# Patient Record
Sex: MALE | Race: BLACK OR AFRICAN AMERICAN | NOT HISPANIC OR LATINO | Employment: FULL TIME | URBAN - METROPOLITAN AREA
[De-identification: names, ages, dates, MRNs, and addresses within clinical notes are randomized per-mention and may not be internally consistent; named-entity substitution may affect disease eponyms.]

---

## 2021-08-04 ENCOUNTER — IMMUNIZATION (OUTPATIENT)
Dept: PRIMARY CARE CLINIC | Facility: CLINIC | Age: 56
End: 2021-08-04
Payer: OTHER GOVERNMENT

## 2021-08-04 DIAGNOSIS — Z23 NEED FOR VACCINATION: Primary | ICD-10-CM

## 2021-08-04 PROCEDURE — 0001A COVID-19, MRNA, LNP-S, PF, 30 MCG/0.3 ML DOSE VACCINE: ICD-10-PCS | Mod: CV19,S$GLB,, | Performed by: FAMILY MEDICINE

## 2021-08-04 PROCEDURE — 91300 COVID-19, MRNA, LNP-S, PF, 30 MCG/0.3 ML DOSE VACCINE: ICD-10-PCS | Mod: S$GLB,,, | Performed by: FAMILY MEDICINE

## 2021-08-04 PROCEDURE — 0001A COVID-19, MRNA, LNP-S, PF, 30 MCG/0.3 ML DOSE VACCINE: CPT | Mod: CV19,S$GLB,, | Performed by: FAMILY MEDICINE

## 2021-08-04 PROCEDURE — 91300 COVID-19, MRNA, LNP-S, PF, 30 MCG/0.3 ML DOSE VACCINE: CPT | Mod: S$GLB,,, | Performed by: FAMILY MEDICINE

## 2021-08-25 ENCOUNTER — IMMUNIZATION (OUTPATIENT)
Dept: PRIMARY CARE CLINIC | Facility: CLINIC | Age: 56
End: 2021-08-25
Payer: OTHER GOVERNMENT

## 2021-08-25 DIAGNOSIS — Z23 NEED FOR VACCINATION: Primary | ICD-10-CM

## 2021-08-25 PROCEDURE — 0002A COVID-19, MRNA, LNP-S, PF, 30 MCG/0.3 ML DOSE VACCINE: CPT | Mod: CV19,S$GLB,, | Performed by: FAMILY MEDICINE

## 2021-08-25 PROCEDURE — 0002A COVID-19, MRNA, LNP-S, PF, 30 MCG/0.3 ML DOSE VACCINE: ICD-10-PCS | Mod: CV19,S$GLB,, | Performed by: FAMILY MEDICINE

## 2021-08-25 PROCEDURE — 91300 COVID-19, MRNA, LNP-S, PF, 30 MCG/0.3 ML DOSE VACCINE: ICD-10-PCS | Mod: S$GLB,,, | Performed by: FAMILY MEDICINE

## 2021-08-25 PROCEDURE — 91300 COVID-19, MRNA, LNP-S, PF, 30 MCG/0.3 ML DOSE VACCINE: CPT | Mod: S$GLB,,, | Performed by: FAMILY MEDICINE

## 2023-06-20 VITALS — WEIGHT: 178 LBS | BODY MASS INDEX: 31.54 KG/M2 | HEIGHT: 63 IN

## 2023-06-20 DIAGNOSIS — Z86.010 PERSONAL HISTORY OF COLONIC POLYPS: Primary | ICD-10-CM

## 2023-06-20 RX ORDER — SOD SULF/POT CHLORIDE/MAG SULF 1.479 G
12 TABLET ORAL DAILY
Qty: 24 TABLET | Refills: 0 | Status: SHIPPED | OUTPATIENT
Start: 2023-06-20

## 2023-06-20 NOTE — TELEPHONE ENCOUNTER
Pt wife came into clinic and got chart updated and scheduled. Pt wife voiced no hx of cpap, heart stent or walking problem. Pt wife will have information for the  when she leave.

## 2023-06-26 ENCOUNTER — TELEPHONE (OUTPATIENT)
Dept: GASTROENTEROLOGY | Facility: CLINIC | Age: 58
End: 2023-06-26
Payer: COMMERCIAL

## 2023-06-26 NOTE — TELEPHONE ENCOUNTER
----- Message from Yasmeen Donald sent at 6/26/2023  8:23 AM CDT -----  Regarding: Prep Medication  Contact: patient  Per phone call with patient, she stated that her  would like to take the liquid medication and not the sutab.  Please submit the liquid medication to the pharmacy.  Please return call at [phone] (home) or [phone].      [facility] [address] [facility]    Thanks,  SJ

## 2023-06-26 NOTE — TELEPHONE ENCOUNTER
Patient called and said you wanted her to increase her Pantoprazole to 40 mg 2 times a day.  She will need a refill if this is correct.  [facility].

## 2023-06-26 NOTE — TELEPHONE ENCOUNTER
Prep was called in to wrong pharmacy and he wants the liquid prep so the prep was called in to [facility] on [address] and patient was informed.

## 2023-06-26 NOTE — TELEPHONE ENCOUNTER
----- Message from Yasmeen Donald sent at 6/26/2023  8:23 AM CDT -----  Regarding: Prep Medication  Contact: patient  Per phone call with patient, she stated that her  would like to take the liquid medication and not the sutab.  Please submit the liquid medication to the pharmacy.  Please return call at [phone] (home) or [phone].      [facility] [address] [facility]    Thanks,  SJ

## 2023-08-04 DIAGNOSIS — Z86.010 PERSONAL HISTORY OF COLONIC POLYPS: Primary | ICD-10-CM

## 2023-08-04 NOTE — PROGRESS NOTES
"[facility]  [address] [place] [address]  Phone: [phone]  Fax: [phone]    History & Physical         Provider: Dr. Amauri Goff    Patient Name: Jose Cruz GONZALEZ (age):1965  57 y.o.           Gender: male   Phone: [phone]     Referring Physician: Katherine Perdomo     Vital Signs:   Height - 5' 3"  Weight - 178 lbs  BMI -  31.5    Plan: Colonoscopy    Encounter Diagnosis   Name Primary?    Personal history of colonic polyps Yes           History:      Past Medical History:   Diagnosis Date    Hx of colonic polyps       Past Surgical History:   Procedure Laterality Date    COLONOSCOPY  2018      Medication List with Changes/Refills   Current Medications    SOD SULF-POT CHLORIDE-MAG SULF (SUTAB) 1.479-0.188- 0.225 GRAM TABLET    Take 12 tablets by mouth once daily. Take according to package instructions with indicated amount of water. No breakfast day before test. May substitute with Suprep, Clenpiq, Plenvu, Moviprep or GoLytely based on Rx plan and patient preference.      Review of patient's allergies indicates:  No Known Allergies   Family History   Problem Relation Age of Onset    COPD Mother 62    No Known Problems Father     Hodgkin's lymphoma Brother 32    No Known Problems Maternal Grandmother     No Known Problems Maternal Grandfather     No Known Problems Paternal Grandmother     No Known Problems Paternal Grandfather       Social History     Tobacco Use    Smoking status: Every Day     Current packs/day: 0.00     Types: Cigarettes    Smokeless tobacco: Never   Substance Use Topics    Alcohol use: Never    Drug use: Never        Physical Examination:     General Appearance:___________________________  HEENT: " _____________________________________  Abdomen:____________________________________  Heart:________________________________________  Lungs:_______________________________________  Extremities:___________________________________  Skin:_________________________________________  Endocrine:____________________________________  Genitourinary:_________________________________  Neurological:__________________________________      Patient has been evaluated immediately prior to sedation and is medically cleared for endoscopy with IVCS as an ASA class: ______      Physician Signature: _________________________       Date: ________  Time: ________

## 2023-08-08 ENCOUNTER — OUTSIDE PLACE OF SERVICE (OUTPATIENT)
Dept: GASTROENTEROLOGY | Facility: CLINIC | Age: 58
End: 2023-08-08
Payer: COMMERCIAL

## 2023-08-08 LAB — CRC RECOMMENDATION EXT: NORMAL

## 2023-08-08 PROCEDURE — G0105 COLORECTAL SCRN; HI RISK IND: HCPCS | Mod: ,,, | Performed by: INTERNAL MEDICINE

## 2023-08-08 PROCEDURE — G0105 COLORECTAL SCRN; HI RISK IND: ICD-10-PCS | Mod: ,,, | Performed by: INTERNAL MEDICINE

## 2023-12-15 ENCOUNTER — DOCUMENTATION ONLY (OUTPATIENT)
Dept: GASTROENTEROLOGY | Facility: CLINIC | Age: 58
End: 2023-12-15

## 2025-06-18 ENCOUNTER — OUTSIDE PLACE OF SERVICE (OUTPATIENT)
Dept: PULMONOLOGY | Facility: CLINIC | Age: 60
End: 2025-06-18
Payer: COMMERCIAL

## 2025-06-19 ENCOUNTER — OUTSIDE PLACE OF SERVICE (OUTPATIENT)
Dept: PULMONOLOGY | Facility: CLINIC | Age: 60
End: 2025-06-19

## 2025-06-19 ENCOUNTER — OUTSIDE PLACE OF SERVICE (OUTPATIENT)
Dept: PULMONOLOGY | Facility: CLINIC | Age: 60
End: 2025-06-19
Payer: COMMERCIAL

## 2025-06-20 ENCOUNTER — TELEPHONE (OUTPATIENT)
Dept: HEMATOLOGY/ONCOLOGY | Facility: CLINIC | Age: 60
End: 2025-06-20
Payer: COMMERCIAL

## 2025-06-20 NOTE — TELEPHONE ENCOUNTER
Spoke with the patients wife Ms. Garner appointment date time and location provided. All question answered. TTRN

## 2025-06-25 ENCOUNTER — OFFICE VISIT (OUTPATIENT)
Dept: HEMATOLOGY/ONCOLOGY | Facility: CLINIC | Age: 60
End: 2025-06-25
Payer: COMMERCIAL

## 2025-06-25 VITALS
WEIGHT: 206.19 LBS | OXYGEN SATURATION: 93 % | HEIGHT: 69 IN | SYSTOLIC BLOOD PRESSURE: 147 MMHG | RESPIRATION RATE: 16 BRPM | HEART RATE: 117 BPM | BODY MASS INDEX: 30.54 KG/M2 | DIASTOLIC BLOOD PRESSURE: 95 MMHG

## 2025-06-25 DIAGNOSIS — C34.81 SMALL CELL CARCINOMA OF OVERLAPPING SITES OF RIGHT LUNG: Primary | ICD-10-CM

## 2025-06-25 RX ORDER — METFORMIN HYDROCHLORIDE 500 MG/1
500 TABLET ORAL DAILY
COMMUNITY

## 2025-06-25 NOTE — PROGRESS NOTES
MEDICAL ONCOLOGY INITIAL CONSULTATION NOTE    Patient ID: Jose Cruz Bustillo is a 59 y.o. male.    Chief Complaint: Small cell lung carcinoma    HPI:  Patient is a 59-year-old male who presented with obstructive pneumonia and was noted to have malignant tumor in the lung and liver by CTA chest and CT abdomen pelvis. Patient was found to have sepsis from postobstructive pneumonia, thoracocentesis was done with 700 mL of bloody appearing fluid which fluid was sent to lab for studies and cytology. Patient was given IV Zosyn and IV fluid. Labs reviewed LA 2.6 with repeat 1.1, pleural fluid showed cholesterol 99, TG 19, albumin 2.5, amylase 40, RBC 11K Bloody, LDH 1 8, glucose 266, protein 4.8, AFB, culture and cytology in process, D-dimer 1640 elevated, Pro-Ethan 63.11 elevated, C-reactive protein 1.5 elevated, troponin 12 normal, proBNP 40 normal, UA/proteinuria, blood culture in process, PTT normal,  elevated,  low. CT A PE chest, CT abdomen pelvic showed perihilar mass 3.7 cm with numerous metastasis to the right pleural, right pericardial fat and lymph node with significant mass effect on the right middle and lower lobe bronchi with near complete collapse of these lobes, negative PE, large to moderate right pleural effusion before thoracocentesis, 6 mm left lower lobe pulmonary nodule, malignancy within the right hepatic dome which could be direct metastasis or extension of adjacent pleural metastasis. Patient underwent bronchoscopic biopsy on 6/19 by pulmonology and tissue biopsy sent. Patient received Zosyn as inpatient and plan to be discharged on Augmentin. Patient weaned off oxygen and noted to saturate above 95% on room air upon exertion. Upon pulmonology evaluation patient appears to have underlying COPD/asthma as well and patient planned to be discharged on Trelegy and to follow-up with pulmonology next week Cultures negative to date.  Please see pathology report below.  He presents to our clinic  today for further evaluation.    Pathology:  25    A. LN 7:    Positive for small-cell carcinoma    B. Right hilar mass biopsy:  Positive for small-cell carcinoma    C.  Bronchoalveolar lavage: Right lower lobe    Blood, neutrophils with acute inflammation and rare atypical cells          Imagin25:    CT scan:    Chest   1. There is a right perihilar mass which measures 3.7 cm. There are numerous metastases to the right pleura and a metastasis to the right pericardial fat and to a subcarinal lymph node. The right hilar mass exerts significant mass effect on right middle   and lower lobe bronchi with near complete collapse of these lobes. Additional foci of neoplasm within the collapsed right middle and lower lobes is not excluded. There is also significant mass effect on the right lower lobe pulmonary artery. Thoracic   malignancy is suboptimally evaluated on this pulmonary arterial phase CT. Venous phase chest CT could be considered.   2. Negative for pulmonary embolism.   3. Large to moderate right pleural effusion.   4. There is a 6 mm left lower lobe pulmonary nodule.     Abdomen and pelvis   1. There is evidence of malignancy within the right hepatic dome, which could represent a metastasis or direct extension by adjacent pleural metastases.                     Past Medical History:   Diagnosis Date    Hx of colonic polyps      Family History   Problem Relation Name Age of Onset    COPD Mother  62    No Known Problems Father      Hodgkin's lymphoma Brother  32    Cancer Brother      No Known Problems Maternal Grandmother      No Known Problems Maternal Grandfather      No Known Problems Paternal Grandmother      No Known Problems Paternal Grandfather       Social History[1]  Past Surgical History:   Procedure Laterality Date    COLONOSCOPY  2018         Review of systems:  Review of Systems   Constitutional:  Negative for activity change, appetite change, chills, diaphoresis, fatigue and  unexpected weight change.   HENT:  Negative for congestion, facial swelling, hearing loss, mouth sores, trouble swallowing and voice change.    Eyes:  Negative for photophobia, pain, discharge and itching.   Respiratory:  Negative for apnea, cough, choking, chest tightness and shortness of breath.    Cardiovascular:  Negative for chest pain, palpitations and leg swelling.   Gastrointestinal:  Negative for abdominal distention, abdominal pain, anal bleeding and blood in stool.   Endocrine: Negative for cold intolerance, heat intolerance, polydipsia and polyphagia.   Genitourinary:  Negative for difficulty urinating, dysuria, flank pain and hematuria.   Musculoskeletal:  Negative for arthralgias, back pain, joint swelling, myalgias, neck pain and neck stiffness.   Skin:  Negative for color change, pallor and wound.   Allergic/Immunologic: Negative for environmental allergies, food allergies and immunocompromised state.   Neurological:  Negative for dizziness, seizures, facial asymmetry, speech difficulty, light-headedness, numbness and headaches.   Hematological:  Negative for adenopathy. Does not bruise/bleed easily.   Psychiatric/Behavioral:  Negative for agitation, behavioral problems, confusion, decreased concentration and sleep disturbance.            Physical Exam  Vitals and nursing note reviewed.   Constitutional:       General: He is not in acute distress.     Appearance: Normal appearance. He is not ill-appearing.   HENT:      Head: Normocephalic and atraumatic.      Nose: No congestion or rhinorrhea.   Eyes:      General: No scleral icterus.     Extraocular Movements: Extraocular movements intact.      Pupils: Pupils are equal, round, and reactive to light.   Cardiovascular:      Rate and Rhythm: Normal rate and regular rhythm.      Pulses: Normal pulses.      Heart sounds: Normal heart sounds. No murmur heard.     No gallop.   Pulmonary:      Effort: Pulmonary effort is normal. No respiratory distress.       Breath sounds: Normal breath sounds. No stridor. No wheezing or rhonchi.   Abdominal:      General: Bowel sounds are normal. There is no distension.      Palpations: There is no mass.      Tenderness: There is no abdominal tenderness. There is no guarding.   Musculoskeletal:         General: No swelling, tenderness, deformity or signs of injury. Normal range of motion.      Cervical back: Normal range of motion and neck supple. No rigidity. No muscular tenderness.      Right lower leg: No edema.      Left lower leg: No edema.   Skin:     General: Skin is warm.      Coloration: Skin is not jaundiced or pale.      Findings: No bruising or lesion.   Neurological:      General: No focal deficit present.      Mental Status: He is alert and oriented to person, place, and time.      Cranial Nerves: No cranial nerve deficit.      Sensory: No sensory deficit.      Motor: No weakness.      Gait: Gait normal.   Psychiatric:         Mood and Affect: Mood normal.         Behavior: Behavior normal.         Thought Content: Thought content normal.       Vitals:    06/25/25 1528   BP: (!) 147/95   Pulse: (!) 117   Resp: 16   Body surface area is 2.13 meters squared.    Assessment/Plan:      ECOG 1    Extensive stage small-cell lung cancer arising from the right hilum with metastasis to the pleura, right pericardium and liver:  Stage IV    == will obtain MRI brain for completion of staging and to rule out brain Mets  == will start patient on carbo etoposide Tecentriq to be followed by Tecentriq indefinitely  == tempus testing on tumor specimen        Plan:  MRI brain  Prior authorization for carbo etoposide Tecentriq  Port placement: Dr. Frey  First chemo can be given with peripheral IV if needed      Return to clinic in 1 week for NP visit with labs for treatment education      Future Appointments   Date Time Provider Department Center   6/30/2025 10:40 AM Mary Ku NP [facility]             Advance Care Planning      Date: 06/25/2025    Power of   I initiated the process of voluntary advance care planning today and explained the importance of this process to the patient.  I introduced the concept of advance directives to the patient, as well. Then the patient received detailed information about the importance of designating a Health Care Power of  (HCPOA). He was also instructed to communicate with this person about their wishes for future healthcare, should he become sick and lose decision-making capacity. The patient has not previously appointed a HCPOA. After our discussion, the patient has decided to complete a HCPOA and has appointed his  health care agent. Please see scanned on chart. I encouraged him to communicate with this person about their wishes for future healthcare, should he become sick and lose decision-making capacity.      A total of 20 min was spent on advance care planning, goals of care discussion, emotional support, formulating and communicating prognosis and exploring burden/benefit of various approaches of treatment. This discussion occurred on a fully voluntary basis with the verbal consent of the patient and/or family.           Total time spent in counseling and discussion about further management options including relevant lab work, treatment,  prognosis, medications and intended side effects was more than 60 minutes. More than 50% of the time was spent on counseling and coordination of care.  This includes face to face time and non-face to face time preparing to see the patient (eg, review of tests), Obtaining and/or reviewing separately obtained history, Documenting clinical information in the electronic or other health record, Independently interpreting resultsand communicating results to the patient/family/caregiver, or Care coordination.            [1]   Social History  Socioeconomic History    Marital status:    Tobacco Use    Smoking status: Former     Types:  Cigarettes    Smokeless tobacco: Never   Substance and Sexual Activity    Alcohol use: Not Currently    Drug use: Never     Social Drivers of Health     Financial Resource Strain: Low Risk  (6/16/2025)    [facility]    Overall Financial Resource Strain (CARDIA)     Difficulty of Paying Living Expenses: Not hard at all   Food Insecurity: No Food Insecurity (6/16/2025)    Received from [facility]    Hunger Vital Sign     Worried About Running Out of Food in the Last Year: Never true     Ran Out of Food in the Last Year: Never true   Transportation Needs: No Transportation Needs (6/16/2025)    Received from [facility]    PRAPARE - Transportation     In the past 12 months, has lack of transportation kept you from medical appointments or from getting medications?: No   Housing Stability: Low Risk  (6/16/2025)    [facility]    Housing Stability Vital Sign     At any time in the past 12 months, were you homeless or living in a shelter (including now)?: No

## 2025-06-26 ENCOUNTER — TELEPHONE (OUTPATIENT)
Dept: HEMATOLOGY/ONCOLOGY | Facility: CLINIC | Age: 60
End: 2025-06-26

## 2025-06-26 ENCOUNTER — PATIENT MESSAGE (OUTPATIENT)
Dept: HEMATOLOGY/ONCOLOGY | Facility: CLINIC | Age: 60
End: 2025-06-26

## 2025-06-26 DIAGNOSIS — C34.81 SMALL CELL CARCINOMA OF OVERLAPPING SITES OF RIGHT LUNG: Primary | ICD-10-CM

## 2025-06-26 RX ORDER — OLANZAPINE 5 MG/1
TABLET, FILM COATED ORAL
Qty: 4 TABLET | Refills: 3 | Status: SHIPPED | OUTPATIENT
Start: 2025-06-26

## 2025-06-26 RX ORDER — HYDROCODONE BITARTRATE AND ACETAMINOPHEN 10; 325 MG/1; MG/1
1 TABLET ORAL EVERY 8 HOURS PRN
Qty: 90 TABLET | Refills: 0 | Status: SHIPPED | OUTPATIENT
Start: 2025-06-26

## 2025-06-26 RX ORDER — DEXAMETHASONE 4 MG/1
8 TABLET ORAL DAILY
Qty: 6 TABLET | Refills: 3 | Status: SHIPPED | OUTPATIENT
Start: 2025-06-26

## 2025-06-26 RX ORDER — PROCHLORPERAZINE MALEATE 5 MG
10 TABLET ORAL EVERY 6 HOURS PRN
Qty: 20 TABLET | Refills: 5 | Status: SHIPPED | OUTPATIENT
Start: 2025-06-26

## 2025-06-26 NOTE — TELEPHONE ENCOUNTER
Copied from CRM #[id number]. Topic: Medications - Medication Status Check   >> Jun 26, 2025  8:08 AM Abigail wrote:  Patient is calling in regards to pain medication have not been call [facility]..please call them back at [phone]

## 2025-06-27 ENCOUNTER — OUTSIDE PLACE OF SERVICE (OUTPATIENT)
Dept: INTERVENTIONAL RADIOLOGY/VASCULAR | Facility: CLINIC | Age: 60
End: 2025-06-27
Payer: COMMERCIAL

## 2025-06-27 DIAGNOSIS — C34.81 SMALL CELL CARCINOMA OF OVERLAPPING SITES OF RIGHT LUNG: Primary | ICD-10-CM

## 2025-06-27 NOTE — PROGRESS NOTES
MEDICAL ONCOLOGY INITIAL CONSULTATION NOTE    Patient ID: Jose Cruz Bustillo is a 59 y.o. male.    Chief Complaint: Small cell lung carcinoma    HPI:  Patient is a 59-year-old male who presented with obstructive pneumonia and was noted to have malignant tumor in the lung and liver by CTA chest and CT abdomen pelvis. Patient was found to have sepsis from postobstructive pneumonia, thoracocentesis was done with 700 mL of bloody appearing fluid which fluid was sent to lab for studies and cytology. Patient was given IV Zosyn and IV fluid. Labs reviewed LA 2.6 with repeat 1.1, pleural fluid showed cholesterol 99, TG 19, albumin 2.5, amylase 40, RBC 11K Bloody, LDH 1 8, glucose 266, protein 4.8, AFB, culture and cytology in process, D-dimer 1640 elevated, Pro-Ethan 63.11 elevated, C-reactive protein 1.5 elevated, troponin 12 normal, proBNP 40 normal, UA/proteinuria, blood culture in process, PTT normal,  elevated,  low. CT A PE chest, CT abdomen pelvic showed perihilar mass 3.7 cm with numerous metastasis to the right pleural, right pericardial fat and lymph node with significant mass effect on the right middle and lower lobe bronchi with near complete collapse of these lobes, negative PE, large to moderate right pleural effusion before thoracocentesis, 6 mm left lower lobe pulmonary nodule, malignancy within the right hepatic dome which could be direct metastasis or extension of adjacent pleural metastasis. Patient underwent bronchoscopic biopsy on 6/19 by pulmonology and tissue biopsy sent. Patient received Zosyn as inpatient and plan to be discharged on Augmentin. Patient weaned off oxygen and noted to saturate above 95% on room air upon exertion. Upon pulmonology evaluation patient appears to have underlying COPD/asthma as well and patient planned to be discharged on Trelegy and to follow-up with pulmonology next week Cultures negative to date.  Please see pathology report below.  He presents to our clinic  today for further evaluation.    Pathology:  25    A. LN 7:    Positive for small-cell carcinoma    B. Right hilar mass biopsy:  Positive for small-cell carcinoma    C.  Bronchoalveolar lavage: Right lower lobe    Blood, neutrophils with acute inflammation and rare atypical cells          Imagin25:    CT scan:    Chest   1. There is a right perihilar mass which measures 3.7 cm. There are numerous metastases to the right pleura and a metastasis to the right pericardial fat and to a subcarinal lymph node. The right hilar mass exerts significant mass effect on right middle   and lower lobe bronchi with near complete collapse of these lobes. Additional foci of neoplasm within the collapsed right middle and lower lobes is not excluded. There is also significant mass effect on the right lower lobe pulmonary artery. Thoracic   malignancy is suboptimally evaluated on this pulmonary arterial phase CT. Venous phase chest CT could be considered.   2. Negative for pulmonary embolism.   3. Large to moderate right pleural effusion.   4. There is a 6 mm left lower lobe pulmonary nodule.     Abdomen and pelvis   1. There is evidence of malignancy within the right hepatic dome, which could represent a metastasis or direct extension by adjacent pleural metastases.         Past Medical History:   Diagnosis Date    Hx of colonic polyps      Family History   Problem Relation Name Age of Onset    COPD Mother  62    No Known Problems Father      Hodgkin's lymphoma Brother  32    Cancer Brother      No Known Problems Maternal Grandmother      No Known Problems Maternal Grandfather      No Known Problems Paternal Grandmother      No Known Problems Paternal Grandfather       Social History[1]  Past Surgical History:   Procedure Laterality Date    COLONOSCOPY  2018         Review of systems:  Review of Systems   Constitutional:  Negative for activity change, appetite change, chills, diaphoresis, fatigue and unexpected weight  change.   HENT:  Negative for congestion, facial swelling, hearing loss, mouth sores, trouble swallowing and voice change.    Eyes:  Negative for photophobia, pain, discharge and itching.   Respiratory:  Negative for apnea, cough, choking, chest tightness and shortness of breath.    Cardiovascular:  Negative for chest pain, palpitations and leg swelling.   Gastrointestinal:  Negative for abdominal distention, abdominal pain, anal bleeding and blood in stool.   Endocrine: Negative for cold intolerance, heat intolerance, polydipsia and polyphagia.   Genitourinary:  Negative for difficulty urinating, dysuria, flank pain and hematuria.   Musculoskeletal:  Negative for arthralgias, back pain, joint swelling, myalgias, neck pain and neck stiffness.   Skin:  Negative for color change, pallor and wound.   Allergic/Immunologic: Negative for environmental allergies, food allergies and immunocompromised state.   Neurological:  Negative for dizziness, seizures, facial asymmetry, speech difficulty, light-headedness, numbness and headaches.   Hematological:  Negative for adenopathy. Does not bruise/bleed easily.   Psychiatric/Behavioral:  Negative for agitation, behavioral problems, confusion, decreased concentration and sleep disturbance.            Physical Exam  Vitals and nursing note reviewed.   Constitutional:       General: He is not in acute distress.     Appearance: Normal appearance. He is not ill-appearing.   HENT:      Head: Normocephalic and atraumatic.      Nose: No congestion or rhinorrhea.   Eyes:      General: No scleral icterus.     Extraocular Movements: Extraocular movements intact.      Pupils: Pupils are equal, round, and reactive to light.   Cardiovascular:      Rate and Rhythm: Normal rate and regular rhythm.      Pulses: Normal pulses.      Heart sounds: Normal heart sounds. No murmur heard.     No gallop.   Pulmonary:      Effort: Pulmonary effort is normal. No respiratory distress.      Breath sounds:  Normal breath sounds. No stridor. No wheezing or rhonchi.   Abdominal:      General: Bowel sounds are normal. There is no distension.      Palpations: There is no mass.      Tenderness: There is no abdominal tenderness. There is no guarding.   Musculoskeletal:         General: No swelling, tenderness, deformity or signs of injury. Normal range of motion.      Cervical back: Normal range of motion and neck supple. No rigidity. No muscular tenderness.      Right lower leg: No edema.      Left lower leg: No edema.   Skin:     General: Skin is warm.      Coloration: Skin is not jaundiced or pale.      Findings: No bruising or lesion.   Neurological:      General: No focal deficit present.      Mental Status: He is alert and oriented to person, place, and time.      Cranial Nerves: No cranial nerve deficit.      Sensory: No sensory deficit.      Motor: No weakness.      Gait: Gait normal.   Psychiatric:         Mood and Affect: Mood normal.         Behavior: Behavior normal.         Thought Content: Thought content normal.       Vitals:    06/30/25 1023   BP: 131/89   Pulse: 106   Resp: 16   Temp: 98.9 °F (37.2 °C)   Body surface area is 2.13 meters squared.    Assessment/Plan:      ECOG 1    Extensive stage small-cell lung cancer arising from the right hilum with metastasis to the pleura, right pericardium and liver:  Stage IV    == will obtain MRI brain for completion of staging and to rule out brain Mets  == will start patient on carbo etoposide Tecentriq to be followed by Tecentriq indefinitely  == tempus testing on tumor specimen  6/30/25:  Patient here today to discuss upcoming chemotherapy/immunotherapy. An extensive discussion was had which included a thorough discussion of potential side effect profile, risk versus benefits, and expected outcomes.  Risks, including but not limited to, possible hair loss, bone marrow damage, damage to body organs, allergic reactions, sterility, nausea/vomiting,  constipation/diarrhea, sores in the mouth, secondary cancers, and rarely death were all discuss.  Specific side effects pertaining to their chemotherapy/immunotherapy medications were discussed as well.  The patient agrees with the plan and all questions and their support systems questions have been answered to their satisfaction.  Premedications were prescribed and patient was educated on appropriate usage.  Patient was educated on when to call, and given the number to call, or to notify the provider including but not limited to:  Persistent nausea and vomiting, dehydration, fever greater than 100.4 lasting over 1 hour induration or isolated feeders greater than 101, rash while on active chemotherapy or immunotherapy, severe pain or new onset pain not controlled by current pain medication regimen.      2. DM  Newly dx with diabetes while in-patient placed on metformin, increased LFTs advised to hold metformin and referral place to new PCP for evaluation    3. Elevated LFTs  See above    4. Referral to PCP for diabetes management    5. CINV - Antiemetics sent to pharmacy       Plan:  MRI brain pending authorization  Prior authorization for carbo etoposide Tecentriq to start tomorrow  Port placement: Dr. Frey pending  PICC can be pulled on day 3   First chemo can be given with peripheral IV if needed      Return to clinic in 3 week for NP visit with labs       No future appointments.            Advance Care Planning     Date: 06/25/2025    Power of   I initiated the process of voluntary advance care planning today and explained the importance of this process to the patient.  I introduced the concept of advance directives to the patient, as well. Then the patient received detailed information about the importance of designating a Health Care Power of  (HCPOA). He was also instructed to communicate with this person about their wishes for future healthcare, should he become sick and lose decision-making  capacity. The patient has not previously appointed a HCPOA. After our discussion, the patient has decided to complete a HCPOA and has appointed his  health care agent. Please see scanned on chart. I encouraged him to communicate with this person about their wishes for future healthcare, should he become sick and lose decision-making capacity.      A total of 20 min was spent on advance care planning, goals of care discussion, emotional support, formulating and communicating prognosis and exploring burden/benefit of various approaches of treatment. This discussion occurred on a fully voluntary basis with the verbal consent of the patient and/or family.           Total time spent in counseling and discussion about further management options including relevant lab work, treatment,  prognosis, medications and intended side effects was more than 60 minutes. More than 50% of the time was spent on counseling and coordination of care.  This includes face to face time and non-face to face time preparing to see the patient (eg, review of tests), Obtaining and/or reviewing separately obtained history, Documenting clinical information in the electronic or other health record, Independently interpreting resultsand communicating results to the patient/family/caregiver, or Care coordination.                [1]   Social History  Socioeconomic History    Marital status:    Tobacco Use    Smoking status: Former     Types: Cigarettes    Smokeless tobacco: Never   Substance and Sexual Activity    Alcohol use: Not Currently    Drug use: Never     Social Drivers of Health     Financial Resource Strain: Low Risk  (6/29/2025)    Overall Financial Resource Strain (CARDIA)     Difficulty of Paying Living Expenses: Not very hard   Food Insecurity: No Food Insecurity (6/29/2025)    Hunger Vital Sign     Worried About Running Out of Food in the Last Year: Never true     Ran Out of Food in the Last Year: Never true   Transportation Needs:  No Transportation Needs (6/29/2025)    PRAPARE - Transportation     Lack of Transportation (Medical): No     Lack of Transportation (Non-Medical): No   Physical Activity: Sufficiently Active (6/29/2025)    Exercise Vital Sign     Days of Exercise per Week: 5 days     Minutes of Exercise per Session: 60 min   Stress: No Stress Concern Present (6/29/2025)    [employer] - Occupational Stress Questionnaire     Feeling of Stress : Not at all   Housing Stability: Low Risk  (6/29/2025)    Housing Stability Vital Sign     Unable to Pay for Housing in the Last Year: No     Homeless in the Last Year: No

## 2025-06-30 ENCOUNTER — CLINICAL SUPPORT (OUTPATIENT)
Dept: HEMATOLOGY/ONCOLOGY | Facility: CLINIC | Age: 60
End: 2025-06-30
Payer: COMMERCIAL

## 2025-06-30 VITALS
OXYGEN SATURATION: 94 % | RESPIRATION RATE: 16 BRPM | WEIGHT: 205.69 LBS | SYSTOLIC BLOOD PRESSURE: 131 MMHG | DIASTOLIC BLOOD PRESSURE: 89 MMHG | HEIGHT: 69 IN | HEART RATE: 106 BPM | TEMPERATURE: 99 F | BODY MASS INDEX: 30.47 KG/M2

## 2025-06-30 DIAGNOSIS — R79.89 ELEVATED LFTS: ICD-10-CM

## 2025-06-30 DIAGNOSIS — R11.2 CINV (CHEMOTHERAPY-INDUCED NAUSEA AND VOMITING): ICD-10-CM

## 2025-06-30 DIAGNOSIS — E11.9 TYPE 2 DIABETES MELLITUS WITHOUT COMPLICATION, WITHOUT LONG-TERM CURRENT USE OF INSULIN: ICD-10-CM

## 2025-06-30 DIAGNOSIS — C34.81 SMALL CELL CARCINOMA OF OVERLAPPING SITES OF RIGHT LUNG: Primary | ICD-10-CM

## 2025-06-30 DIAGNOSIS — T45.1X5A CINV (CHEMOTHERAPY-INDUCED NAUSEA AND VOMITING): ICD-10-CM

## 2025-06-30 PROCEDURE — 99215 OFFICE O/P EST HI 40 MIN: CPT | Mod: S$PBB,,, | Performed by: NURSE PRACTITIONER

## 2025-06-30 RX ORDER — DIPHENHYDRAMINE HYDROCHLORIDE 50 MG/ML
50 INJECTION, SOLUTION INTRAMUSCULAR; INTRAVENOUS ONCE AS NEEDED
Status: CANCELLED | OUTPATIENT
Start: 2025-07-03

## 2025-06-30 RX ORDER — SODIUM CHLORIDE 0.9 % (FLUSH) 0.9 %
10 SYRINGE (ML) INJECTION
Status: CANCELLED | OUTPATIENT
Start: 2025-07-01

## 2025-06-30 RX ORDER — ONDANSETRON 8 MG/1
8 TABLET, ORALLY DISINTEGRATING ORAL EVERY 6 HOURS PRN
Qty: 30 TABLET | Refills: 3 | Status: SHIPPED | OUTPATIENT
Start: 2025-06-30 | End: 2026-06-30

## 2025-06-30 RX ORDER — PROCHLORPERAZINE EDISYLATE 5 MG/ML
10 INJECTION INTRAMUSCULAR; INTRAVENOUS ONCE AS NEEDED
Status: CANCELLED | OUTPATIENT
Start: 2025-07-02

## 2025-06-30 RX ORDER — HEPARIN 100 UNIT/ML
500 SYRINGE INTRAVENOUS
Status: CANCELLED | OUTPATIENT
Start: 2025-07-01

## 2025-06-30 RX ORDER — PROCHLORPERAZINE EDISYLATE 5 MG/ML
10 INJECTION INTRAMUSCULAR; INTRAVENOUS ONCE AS NEEDED
Status: CANCELLED | OUTPATIENT
Start: 2025-07-03

## 2025-06-30 RX ORDER — HEPARIN 100 UNIT/ML
500 SYRINGE INTRAVENOUS
Status: CANCELLED | OUTPATIENT
Start: 2025-07-02

## 2025-06-30 RX ORDER — EPINEPHRINE 0.3 MG/.3ML
0.3 INJECTION SUBCUTANEOUS ONCE AS NEEDED
Status: CANCELLED | OUTPATIENT
Start: 2025-07-01

## 2025-06-30 RX ORDER — EPINEPHRINE 0.3 MG/.3ML
0.3 INJECTION SUBCUTANEOUS ONCE AS NEEDED
Status: CANCELLED | OUTPATIENT
Start: 2025-07-02

## 2025-06-30 RX ORDER — SODIUM CHLORIDE 0.9 % (FLUSH) 0.9 %
10 SYRINGE (ML) INJECTION
Status: CANCELLED | OUTPATIENT
Start: 2025-07-03

## 2025-06-30 RX ORDER — PROCHLORPERAZINE EDISYLATE 5 MG/ML
10 INJECTION INTRAMUSCULAR; INTRAVENOUS ONCE AS NEEDED
Status: CANCELLED | OUTPATIENT
Start: 2025-07-01

## 2025-06-30 RX ORDER — EPINEPHRINE 0.3 MG/.3ML
0.3 INJECTION SUBCUTANEOUS ONCE AS NEEDED
Status: CANCELLED | OUTPATIENT
Start: 2025-07-03

## 2025-06-30 RX ORDER — PROMETHAZINE HYDROCHLORIDE 25 MG/1
25 TABLET ORAL EVERY 8 HOURS
Qty: 30 TABLET | Refills: 3 | Status: SHIPPED | OUTPATIENT
Start: 2025-06-30

## 2025-06-30 RX ORDER — DIPHENHYDRAMINE HYDROCHLORIDE 50 MG/ML
50 INJECTION, SOLUTION INTRAMUSCULAR; INTRAVENOUS ONCE AS NEEDED
Status: CANCELLED | OUTPATIENT
Start: 2025-07-02

## 2025-06-30 RX ORDER — OLANZAPINE 5 MG/1
TABLET, FILM COATED ORAL
Qty: 4 TABLET | Refills: 3 | Status: SHIPPED | OUTPATIENT
Start: 2025-06-30

## 2025-06-30 RX ORDER — HEPARIN 100 UNIT/ML
500 SYRINGE INTRAVENOUS
Status: CANCELLED | OUTPATIENT
Start: 2025-07-03

## 2025-06-30 RX ORDER — DIPHENHYDRAMINE HYDROCHLORIDE 50 MG/ML
50 INJECTION, SOLUTION INTRAMUSCULAR; INTRAVENOUS ONCE AS NEEDED
Status: CANCELLED | OUTPATIENT
Start: 2025-07-01

## 2025-06-30 RX ORDER — PROCHLORPERAZINE MALEATE 5 MG
10 TABLET ORAL EVERY 6 HOURS PRN
Qty: 20 TABLET | Refills: 5 | Status: SHIPPED | OUTPATIENT
Start: 2025-06-30

## 2025-06-30 RX ORDER — SODIUM CHLORIDE 0.9 % (FLUSH) 0.9 %
10 SYRINGE (ML) INJECTION
Status: CANCELLED | OUTPATIENT
Start: 2025-07-02

## 2025-07-01 ENCOUNTER — TELEPHONE (OUTPATIENT)
Dept: HEMATOLOGY/ONCOLOGY | Facility: CLINIC | Age: 60
End: 2025-07-01
Payer: COMMERCIAL

## 2025-07-01 NOTE — TELEPHONE ENCOUNTER
Per request documents faxed to [facility]. Fax # [phone]. [facility]

## 2025-07-02 ENCOUNTER — TELEPHONE (OUTPATIENT)
Dept: HEMATOLOGY/ONCOLOGY | Facility: CLINIC | Age: 60
End: 2025-07-02
Payer: COMMERCIAL

## 2025-07-07 ENCOUNTER — TELEPHONE (OUTPATIENT)
Dept: HEMATOLOGY/ONCOLOGY | Facility: CLINIC | Age: 60
End: 2025-07-07
Payer: COMMERCIAL

## 2025-07-08 ENCOUNTER — OFFICE VISIT (OUTPATIENT)
Dept: FAMILY MEDICINE | Facility: CLINIC | Age: 60
End: 2025-07-08
Payer: COMMERCIAL

## 2025-07-08 VITALS
SYSTOLIC BLOOD PRESSURE: 120 MMHG | BODY MASS INDEX: 30.13 KG/M2 | DIASTOLIC BLOOD PRESSURE: 81 MMHG | HEART RATE: 81 BPM | OXYGEN SATURATION: 97 % | WEIGHT: 204 LBS

## 2025-07-08 DIAGNOSIS — C34.81 SMALL CELL CARCINOMA OF OVERLAPPING SITES OF RIGHT LUNG: ICD-10-CM

## 2025-07-08 DIAGNOSIS — E11.9 TYPE 2 DIABETES MELLITUS WITHOUT COMPLICATION, WITHOUT LONG-TERM CURRENT USE OF INSULIN: Primary | ICD-10-CM

## 2025-07-08 DIAGNOSIS — Z00.00 ENCOUNTER FOR MEDICAL EXAMINATION TO ESTABLISH CARE: ICD-10-CM

## 2025-07-08 LAB — HBA1C MFR BLD: 9.2 %

## 2025-07-08 RX ORDER — GLIMEPIRIDE 2 MG/1
2 TABLET ORAL
Qty: 90 TABLET | Refills: 3 | Status: SHIPPED | OUTPATIENT
Start: 2025-07-08 | End: 2026-07-08

## 2025-07-08 NOTE — PROGRESS NOTES
Subjective:      Patient ID: Jose Cruz Bustillo is a 59 y.o. male.    Chief Complaint: Establish Care (B/S running high)      Patient is a 59-year-old male who presented with obstructive pneumonia and was noted to have malignant tumor in the lung and liver by CTA chest and CT abdomen pelvis. Patient was found to have sepsis from postobstructive pneumonia, thoracocentesis was done with 700 mL of bloody appearing fluid which fluid was sent to lab for studies and cytology. Patient was given IV Zosyn and IV fluid. Labs reviewed LA 2.6 with repeat 1.1, pleural fluid showed cholesterol 99, TG 19, albumin 2.5, amylase 40, RBC 11K Bloody, LDH 1 8, glucose 266, protein 4.8, AFB, culture and cytology in process, D-dimer 1640 elevated, Pro-Ethan 63.11 elevated, C-reactive protein 1.5 elevated, troponin 12 normal, proBNP 40 normal, UA/proteinuria, blood culture in process, PTT normal,  elevated,  low. CT A PE chest, CT abdomen pelvic showed perihilar mass 3.7 cm with numerous metastasis to the right pleural, right pericardial fat and lymph node with significant mass effect on the right middle and lower lobe bronchi with near complete collapse of these lobes, negative PE, large to moderate right pleural effusion before thoracocentesis, 6 mm left lower lobe pulmonary nodule, malignancy within the right hepatic dome which could be direct metastasis or extension of adjacent pleural metastasis. Patient underwent bronchoscopic biopsy on 6/19 by pulmonology and tissue biopsy sent. Patient received Zosyn as inpatient and plan to be discharged on Augmentin. Patient weaned off oxygen and noted to saturate above 95% on room air upon exertion. Upon pulmonology evaluation patient appears to have underlying COPD/asthma as well and patient planned to be discharged on Trelegy and to follow-up with pulmonology next week Cultures negative to date.  Please see pathology report below.       Pathology:  6/20/25     A. LN 7:     Positive for  small-cell carcinoma     B. Right hilar mass biopsy:  Positive for small-cell carcinoma     C.  Bronchoalveolar lavage: Right lower lobe    Blood, neutrophils with acute inflammation and rare atypical cells              Imagin25:     CT scan:     Chest   1. There is a right perihilar mass which measures 3.7 cm. There are numerous metastases to the right pleura and a metastasis to the right pericardial fat and to a subcarinal lymph node. The right hilar mass exerts significant mass effect on right middle   and lower lobe bronchi with near complete collapse of these lobes. Additional foci of neoplasm within the collapsed right middle and lower lobes is not excluded. There is also significant mass effect on the right lower lobe pulmonary artery. Thoracic   malignancy is suboptimally evaluated on this pulmonary arterial phase CT. Venous phase chest CT could be considered.   2. Negative for pulmonary embolism.   3. Large to moderate right pleural effusion.   4. There is a 6 mm left lower lobe pulmonary nodule.     Abdomen and pelvis   1. There is evidence of malignancy within the right hepatic dome, which could represent a metastasis or direct extension by adjacent pleural metastases.          He presents to our clinic today to establish PCP. Main issue is DM2.  He was on metformin for a few days but his LFTs spiked so it was discontinued. Denies P, P, P. Denies DKA.     No acute issues reported.         Past Medical History:   Diagnosis Date    Hx of colonic polyps       Social History     Socioeconomic History    Marital status:    Tobacco Use    Smoking status: Former     Types: Cigarettes    Smokeless tobacco: Never   Substance and Sexual Activity    Alcohol use: Not Currently    Drug use: Never     Social Drivers of Health     Financial Resource Strain: Low Risk  (2025)    Overall Financial Resource Strain (CARDIA)     Difficulty of Paying Living Expenses: Not very hard   Food Insecurity: No  Food Insecurity (6/29/2025)    Hunger Vital Sign     Worried About Running Out of Food in the Last Year: Never true     Ran Out of Food in the Last Year: Never true   Transportation Needs: No Transportation Needs (6/29/2025)    PRAPARE - Transportation     Lack of Transportation (Medical): No     Lack of Transportation (Non-Medical): No   Physical Activity: Sufficiently Active (6/29/2025)    Exercise Vital Sign     Days of Exercise per Week: 5 days     Minutes of Exercise per Session: 60 min   Stress: No Stress Concern Present (6/29/2025)    [employer] - Occupational Stress Questionnaire     Feeling of Stress : Not at all   Housing Stability: Low Risk  (6/29/2025)    Housing Stability Vital Sign     Unable to Pay for Housing in the Last Year: No     Homeless in the Last Year: No      Family History   Problem Relation Name Age of Onset    COPD Mother  62    No Known Problems Father      Hodgkin's lymphoma Brother  32    Cancer Brother      No Known Problems Maternal Grandmother      No Known Problems Maternal Grandfather      No Known Problems Paternal Grandmother      No Known Problems Paternal Grandfather          ROS:   Review of Systems   Constitutional:  Negative for appetite change, chills and fever.   Eyes:  Negative for visual disturbance.   Respiratory:  Negative for cough, chest tightness, shortness of breath and wheezing.    Cardiovascular:  Negative for chest pain.   Gastrointestinal:  Negative for abdominal pain, diarrhea, nausea and vomiting.   Endocrine: Negative for polydipsia, polyphagia and polyuria.   Genitourinary:  Negative for difficulty urinating, dysuria, flank pain and hematuria.   Musculoskeletal:  Negative for back pain and neck pain.   Skin:  Negative for rash.   Neurological:  Negative for dizziness, tremors and headaches.   Hematological:  Negative for adenopathy. Does not bruise/bleed easily.   Psychiatric/Behavioral:  Negative for confusion, dysphoric  mood, hallucinations, sleep disturbance and suicidal ideas.      Objective:   Physical Exam  Vitals and nursing note reviewed.   Constitutional:       General: He is not in acute distress.     Appearance: Normal appearance. He is not ill-appearing.   HENT:      Head: Normocephalic and atraumatic.      Nose: No congestion or rhinorrhea.   Eyes:      General: No scleral icterus.     Extraocular Movements: Extraocular movements intact.      Pupils: Pupils are equal, round, and reactive to light.   Cardiovascular:      Rate and Rhythm: Normal rate and regular rhythm.      Pulses: Normal pulses.      Heart sounds: Normal heart sounds. No murmur heard.     No gallop.   Pulmonary:      Effort: Pulmonary effort is normal. No respiratory distress.      Breath sounds: Normal breath sounds. No stridor. No wheezing or rhonchi.   Abdominal:      General: Bowel sounds are normal. There is no distension.      Palpations: There is no mass.      Tenderness: There is no abdominal tenderness. There is no guarding.   Musculoskeletal:         General: No swelling, tenderness, deformity or signs of injury. Normal range of motion.      Cervical back: Normal range of motion and neck supple. No rigidity. No muscular tenderness.      Right lower leg: No edema.      Left lower leg: No edema.   Skin:     General: Skin is warm.      Coloration: Skin is not jaundiced or pale.      Findings: No bruising or lesion.   Neurological:      General: No focal deficit present.      Mental Status: He is alert and oriented to person, place, and time.      Cranial Nerves: No cranial nerve deficit.      Sensory: No sensory deficit.      Motor: No weakness.      Gait: Gait normal.   Psychiatric:         Mood and Affect: Mood normal.         Behavior: Behavior normal.         Thought Content: Thought content normal.       Assessment:     1. Type 2 diabetes mellitus without complication, without long-term current use of insulin    2. Small cell carcinoma of  overlapping sites of right lung    3. Encounter for medical examination to establish care      No images are attached to the encounter.   Plan:     Problem List Items Addressed This Visit          Oncology    Small cell carcinoma of overlapping sites of right lung    Current Assessment & Plan     Followed by Oncology.             Endocrine    Type 2 diabetes mellitus without complication, without long-term current use of insulin - Primary    Current Assessment & Plan       PLAN      Start Glimepiride 2 mg.  Stop metformin 2/t transaminates.   F/u in 1 mts for A1C.  Will get lipids          Relevant Medications    glimepiride (AMARYL) 2 MG tablet    Other Relevant Orders    Lipid Panel    PSA, Screening    Microalbumin/Creatinine Ratio, Urine    Hemoglobin A1C, POCT (Completed)     Other Visit Diagnoses         Encounter for medical examination to establish care              Procedures     Office Visit on 07/08/2025   Component Date Value Ref Range Status    Hemoglobin A1C, POC 07/08/2025 9.2  % Final        X-Ray Chest 1 View  Result Date: 6/28/2025  XR CHEST XRAY 1 VW    INDICATION: s/p R thoracentesis    COMPARISON: Same day chest radiograph    TECHNIQUE: Frontal view of the chest was obtained.    FINDINGS:  Persistent lobar collapse of the right lower lobe. No pneumothorax identified following thoracentesis. Cardiac silhouette stable. Left lung remains clear.    Signer Name: Jethro Gibson MD  Signed: 6/28/2025 12:31 PM CDT  ()      Thoracentesis  Result Date: 6/28/2025  Felipe Scales MD     6/28/2025 12:43 PM  Thoracentesis    Date/Time: 6/28/2025 12:04 PM    Performed by: Felipe Scales MD  Authorized by: Felipe Scaels MD    Consent:     Consent obtained:  Written    Consent given by:  Patient    Risks discussed:  Bleeding, infection, pain, pneumothorax, nerve damage   and incomplete drainage    Alternatives discussed:  Delayed treatment  Universal protocol:     Procedure  explained and questions answered to patient or proxy's   satisfaction: yes      Relevant documents present and verified: yes      Imaging studies available: yes      Site/side marked: yes      Immediately prior to procedure a time out was called: yes      Patient identity confirmed:  Verbally with patient, arm band and   hospital-assigned identification number  Anesthesia (see MAR for exact dosages):     Anesthesia method:  Local infiltration    Local anesthetic:  Lidocaine 1% WITH epi (3mL)  Procedure details:     Patient position:  Sitting    Location:  R midscapular line    Intercostal space:  7th    Puncture method:  Over-the-needle catheter    Ultrasound guidance: yes      Needle gauge:  18    Number of attempts:  1    Drainage characteristics:  Clear and serosanguinous  Post-procedure details:     Chest x-ray performed: yes      Chest x-ray findings:  Pleural effusion improved    Estimated Blood Loss:  2    Patient tolerance of procedure:  Tolerated well, no immediate   complications  Comments:       removed 700 cc when fluid returned slowed and then stopped, applied   Xeroform with pressure dressing overlying the site      X-Ray Chest 1 View  Result Date: 6/28/2025  XR CHEST XRAY 1 VW    INDICATION: chest pain    COMPARISON: 6/16/2025    TECHNIQUE: Frontal view of the chest was obtained.    FINDINGS:  Persistent lobar collapse of the right lower lobe secondary to known underlying obstructing right hilar mass as seen on prior CT of the chest which appears unchanged from prior. Left lung is clear. No pneumothorax. Cardiac silhouette within normal   limits. Left upper extremity PICC terminates in the distal SVC.    Signer Name: Jethro Gibson MD  Signed: 6/28/2025 10:20 AM CDT  ()      IR PICC line insertion > 5 yrs  Result Date: 6/27/2025  PROCEDURE: Peripherally Inserted Central Catheter (PICC) placement    Procedural Personnel  Attending physician(s): Ky oLpez M.D.  Fellow physician(s):  None  Resident physician(s): None  Advanced practice provider(s): None    Procedure Date (mm/dd/yyyy): 6/27/2025    Pre-procedure diagnosis: Dyspnea  Post-procedure diagnosis: Same  Indication: Right lung mass  Additional clinical history: None    Complications: No immediate complications.      X-Ray Chest 1 View  Result Date: 6/16/2025  XR CHEST XRAY 1 VW    HISTORY:  Status post thoracentesis    COMPARISON:6/16/2025      CT Abdomen Pelvis With IV Contrast  Result Date: 6/16/2025  EXAM: CTA CHEST PULMONARY EMBOLISM PROTOCOL W CONTRAST, CT ABDOMEN PELVIS W CONTRAST, 6/16/2025 8:12 AM PDT    HISTORY: Dyspnea, pneumonia worsening 2-3 weeks, elevated d dimer, post thora aspiration of 700ml. iv contrast 100cc isovue 370, dose 8.9msv    TECHNIQUE: Axial CT images of the chest were obtained in the arterial phase, with CT images of the abdomen pelvis obtained in the venous phase, following intravenous administration of 100 mL of Isovue-370. In accordance with CT protocols and the ALARA   principal, radiation dose reduction techniques were utilized for this examination. Three-dimensional MIPS were generated and saved to PACS. Total radiation dose was 8.9 and 5.6 mSv    COMPARISON: None available at the time of dictation.    FINDINGS:    Chest    Heart and vasculature: Within anticipated limits.    Esophagus: Within anticipated limits.    Lymph nodes: Within anticipated limits.    Lungs and airways: Moderate to large right pleural effusion. Enhancing pleural nodules are present, best appreciated on the right lung base on CT abdomen images there is a 6.5 cm enhancing lesion along the inferior right pleura seen on coronal abdomen   series 5 image 30. Mild emphysema. There is a 6 mm left lower lobe pulmonary nodule series 5 image 190. There is a prominent right posterior pleural metastasis series 5 image 123.    Bones: Within anticipated limits.    Other: There is a 3.17 m subcarinal lymph node. There is right hilar adenopathy.  There is a 2.9 cm right pericardial necrotic lesion which abuts the suprahepatic IVC. There is a right perihilar mass which measures approximately 3.7 cm. This exerts   significant mass effect on the right middle and lower lobe bronchi, with near complete right middle lobe collapse and significant volume loss of the right lower lobe. The mass also exerts mass effect on right lower lobe pulmonary arteries    Abdomen and pelvis  Hepatobiliary pancreatic: There is malignancy within the right hepatic dome measuring up to 2.6 cm. Question metastasis to the hepatic dome versus direct extension by pleural metastases, see sagittal series 6 image 58    Spleen: Within anticipated limits.    Urogenital: Moderate left hydronephrosis without obstructing lesion appreciated. Mild prostatomegaly.    Gastrointestinal: Within anticipated limits. Normal appendix.    Vasculature: Within anticipated limits.    Bones: Within anticipated limits.    Other: No other acute findings.      CTA Chest Non-Coronary (PE Studies)  Result Date: 6/16/2025  EXAM: CTA CHEST PULMONARY EMBOLISM PROTOCOL W CONTRAST, CT ABDOMEN PELVIS W CONTRAST, 6/16/2025 8:12 AM PDT    HISTORY: Dyspnea, pneumonia worsening 2-3 weeks, elevated d dimer, post thora aspiration of 700ml. iv contrast 100cc isovue 370, dose 8.9msv    TECHNIQUE: Axial CT images of the chest were obtained in the arterial phase, with CT images of the abdomen pelvis obtained in the venous phase, following intravenous administration of 100 mL of Isovue-370. In accordance with CT protocols and the ALARA   principal, radiation dose reduction techniques were utilized for this examination. Three-dimensional MIPS were generated and saved to PACS. Total radiation dose was 8.9 and 5.6 mSv    COMPARISON: None available at the time of dictation.    FINDINGS:    Chest    Heart and vasculature: Within anticipated limits.    Esophagus: Within anticipated limits.    Lymph nodes: Within anticipated  limits.    Lungs and airways: Moderate to large right pleural effusion. Enhancing pleural nodules are present, best appreciated on the right lung base on CT abdomen images there is a 6.5 cm enhancing lesion along the inferior right pleura seen on coronal abdomen   series 5 image 30. Mild emphysema. There is a 6 mm left lower lobe pulmonary nodule series 5 image 190. There is a prominent right posterior pleural metastasis series 5 image 123.    Bones: Within anticipated limits.    Other: There is a 3.17 m subcarinal lymph node. There is right hilar adenopathy. There is a 2.9 cm right pericardial necrotic lesion which abuts the suprahepatic IVC. There is a right perihilar mass which measures approximately 3.7 cm. This exerts   significant mass effect on the right middle and lower lobe bronchi, with near complete right middle lobe collapse and significant volume loss of the right lower lobe. The mass also exerts mass effect on right lower lobe pulmonary arteries    Abdomen and pelvis  Hepatobiliary pancreatic: There is malignancy within the right hepatic dome measuring up to 2.6 cm. Question metastasis to the hepatic dome versus direct extension by pleural metastases, see sagittal series 6 image 58    Spleen: Within anticipated limits.    Urogenital: Moderate left hydronephrosis without obstructing lesion appreciated. Mild prostatomegaly.    Gastrointestinal: Within anticipated limits. Normal appendix.    Vasculature: Within anticipated limits.    Bones: Within anticipated limits.    Other: No other acute findings.      Thoracentesis  Result Date: 6/16/2025  Ky Wagner MD     6/16/2025  2:44 PM  Thoracentesis    Date/Time: 6/16/2025 10:34 AM    Performed by: Ky Wagner MD  Authorized by: Ky Wagner MD    Consent:     Consent obtained:  Written and emergent situation    Consent given by:  Patient    Risks discussed:  Bleeding, infection, pain, incomplete drainage and   pneumothorax    Alternatives  discussed:  No treatment and delayed treatment  Universal protocol:     Procedure explained and questions answered to patient or proxy's   satisfaction: yes      Test results available and properly labeled: yes      Patient identity confirmed:  Verbally with patient  Anesthesia (see MAR for exact dosages):     Anesthesia method:  Local infiltration    Local anesthetic:  Lidocaine 1% w/o epi  Procedure details:     Preparation: Patient was prepped and draped in usual sterile fashion      Patient position:  Sitting    Location:  R midscapular line    Intercostal space:  5th    Puncture method:  Over-the-needle catheter    Ultrasound guidance: yes      Indwelling catheter placed: no      Needle gauge:  16    Catheter size:  8 Fr    Number of attempts:  1    Drainage characteristics:  Cloudy, bloody and serosanguinous  Post-procedure details:     Chest x-ray performed: yes      Chest x-ray findings:  Pleural effusion unchanged    Estimated Blood Loss:  0    Patient tolerance of procedure:  Tolerated well, no immediate   complications  Comments:      withdrew 700 mL of fluid from the right lung      X-Ray Chest 1 View  Result Date: 6/16/2025  XR CHEST XRAY 1 VW    HISTORY:  Cough , SOB X 2 MONTHS    COMPARISON:None           Duration of encounter:  minutes  This includes face-to-face time and non face-to-face time preparing to see the patient (eg, review of tests), obtaining and/or reviewing separately obtained history, documenting clinical information in the electronic or other health record, independently interpreting resultsand communicating results to the patient/family/caregiver, or care coordination      DISCLAIMER: This note was prepared with [employer] voice recognition transcription software. Garbled syntax, mangled pronouns, and other bizarre constructions may be attributed to that software system.     All diagnostic data (labs/imaging) was reviewed with the patient and/or family member in the room. All  preventative measures (vaccinations, screenings, testing, imaging) were discussed in depth and offered to the patient in accordance with current medical guidelines to ensure the patient is up to date.  All questions were answered to their liking. The patient and/or family member voiced understanding of all instructions provided. Expectations regarding follow up and treatment plan were voiced and confirmed prior to departure. The patient was given orders/instructions at the end of the visit for reference. They were instructed to notify my office if they have not been contacted for imaging/referrals/labs/results in 1-2 weeks. They voiced understanding of all of the above.     Follow up:     Patient Instructions   Glimepiride (GLYE me flash ride)   Brand Names: US Amaryl [DSC]   Brand Names: Briana APO-Glimepiride [DSC]; GEN-Glimepiride; SANDOZ Glimepiride   What is this drug used for?   It is used to lower blood sugar in patients with high blood sugar (diabetes).  What do I need to tell my doctor BEFORE I take this drug?   If you are allergic to this drug; any part of this drug; or any other drugs, foods, or substances. Tell your doctor about the allergy and what signs you had.  If you have a sulfa allergy.  If you have an acidic blood problem.  If you have type 1 diabetes. Do not use this drug to treat type 1 diabetes.  If the patient is a child. Do not give this drug to a child.  This is not a list of all drugs or health problems that interact with this drug.  Tell your doctor and pharmacist about all of your drugs (prescription or OTC, natural products, vitamins) and health problems. You must check to make sure that it is safe for you to take this drug with all of your drugs and health problems. Do not start, stop, or change the dose of any drug without checking with your doctor.  What are some things I need to know or do while I take this drug?   Tell all of your health care providers that you take this drug. This  includes your doctors, nurses, pharmacists, and dentists.  Follow the diet and exercise plan that your doctor told you about.  Low blood sugar may happen with this drug. Very low blood sugar can lead to seizures, passing out, long lasting brain damage, and sometimes death. Talk with the doctor.  Check your blood sugar as you have been told by your doctor.  Be careful if you have low levels of an enzyme called G6PD. Anemia may happen. Low levels of G6PD may be more likely in patients of , South , , and Mediterranean descent.  Have blood work checked as you have been told by the doctor. Talk with the doctor.  Do not drive if your blood sugar has been low. There is a greater chance of you having a crash.  Talk with your doctor before you drink alcohol.  If you also take colesevelam, take it at least 4 hours after you take this drug.  It may be harder to control blood sugar during times of stress such as fever, infection, injury, or surgery. A change in physical activity, exercise, or diet may also affect blood sugar.  This drug may raise the chance of death from heart disease. Talk with your doctor.  If you are 65 or older, use this drug with care. You could have more side effects.  Tell your doctor if you are pregnant, plan on getting pregnant, or are breast-feeding. You will need to talk about the benefits and risks to you and the baby.  What are some side effects that I need to call my doctor about right away?   WARNING/CAUTION: Even though it may be rare, some people may have very bad and sometimes deadly side effects when taking a drug. Tell your doctor or get medical help right away if you have any of the following signs or symptoms that may be related to a very bad side effect:  Signs of an allergic reaction, like rash; hives; itching; red, swollen, blistered, or peeling skin with or without fever; wheezing; tightness in the chest or throat; trouble breathing, swallowing, or talking;  unusual hoarseness; or swelling of the mouth, face, lips, tongue, or throat.  Very bad dizziness or passing out.  Shortness of breath.  Low blood sugar can happen. The chance may be raised when this drug is used with other drugs for diabetes. Signs may be dizziness, headache, feeling sleepy or weak, shaking, fast heartbeat, confusion, hunger, or sweating. Call your doctor right away if you have any of these signs. Follow what you have been told to do for low blood sugar. This may include taking glucose tablets, liquid glucose, or some fruit juices.  Severe skin reactions have happened with this drug. These have included Rinaldi-Iglesia syndrome (SJS), toxic epidermal necrolysis (TEN), and other severe skin reactions. Get medical help right away if you have signs like red, swollen, blistered, or peeling skin; other skin irritation (with or without fever); red or irritated eyes; or sores in your mouth, throat, nose, or eyes.  What are some other side effects of this drug?   All drugs may cause side effects. However, many people have no side effects or only have minor side effects. Call your doctor or get medical help if any of these side effects or any other side effects bother you or do not go away:  Upset stomach.  Feeling dizzy, tired, or weak.  Headache.  Flu-like signs.  Weight gain.  These are not all of the side effects that may occur. If you have questions about side effects, call your doctor. Call your doctor for medical advice about side effects.  You may report side effects to your national health agency.  You may report side effects to the FDA at 1-799.697.2401. You may also report side effects at https://www.fda.gov/medwatch.  How is this drug best taken?   Use this drug as ordered by your doctor. Read all information given to you. Follow all instructions closely.  Take with a meal.  Take with the first meal of the day, if taking once a day.  Take this drug at the same time of day.  Be sure you know what  to do if you do not eat as much as normal or if you skip a meal.  What do I do if I miss a dose?   Take a missed dose as soon as you think about it, with a meal.  If it is close to the time for your next dose, skip the missed dose and go back to your normal time.  Do not take 2 doses at the same time or extra doses.  How do I store and/or throw out this drug?   Store at room temperature in a dry place. Do not store in a bathroom.  Keep all drugs in a safe place. Keep all drugs out of the reach of children and pets.  Throw away unused or  drugs. Do not flush down a toilet or pour down a drain unless you are told to do so. Check with your pharmacist if you have questions about the best way to throw out drugs. There may be drug take-back programs in your area.  General drug facts   If your symptoms or health problems do not get better or if they become worse, call your doctor.  Do not share your drugs with others and do not take anyone else's drugs.  Some drugs may have another patient information leaflet. If you have any questions about this drug, please talk with your doctor, nurse, pharmacist, or other health care provider.  Some drugs may have another patient information leaflet. Check with your pharmacist. If you have any questions about this drug, please talk with your doctor, nurse, pharmacist, or other health care provider.  If you think there has been an overdose, call your poison control center or get medical care right away. Be ready to tell or show what was taken, how much, and when it happened.  Consumer Information Use and Disclaimer   This generalized information is a limited summary of diagnosis, treatment, and/or medication information. It is not meant to be comprehensive and should be used as a tool to help the user understand and/or assess potential diagnostic and treatment options. It does NOT include all information about conditions, treatments, medications, side effects, or risks that may  apply to a specific patient. It is not intended to be medical advice or a substitute for the medical advice, diagnosis, or treatment of a health care provider based on the health care provider's examination and assessment of a patient's specific and unique circumstances. Patients must speak with a health care provider for complete information about their health, medical questions, and treatment options, including any risks or benefits regarding use of medications. This information does not endorse any treatments or medications as safe, effective, or approved for treating a specific patient. UpTo[employer], Inc[employer] and its affiliates disclaim any warranty or liability relating to this information or the use thereof. The use of this information is governed by the Terms of Use, available at https://www.woltersvitalclipuwer.com/en/know/clinical-effectiveness-terms.  Last Reviewed Date   2022-10-21  Copyright   © 2024 [employer] Inc[employer] and its affiliates and/or licensors. All rights reserved.     Follow up in about 1 month (around 8/8/2025), or if symptoms worsen or fail to improve.

## 2025-07-08 NOTE — ASSESSMENT & PLAN NOTE
PLAN      Start Glimepiride 2 mg.  Stop metformin 2/t transaminates.   F/u in 1 mts for A1C.  Will get lipids

## 2025-07-08 NOTE — PATIENT INSTRUCTIONS
Glimepiride (MARLON pires)   Brand Names: US Amaryl [DSC]   Brand Names: Briana APO-Glimepiride [DSC]; GEN-Glimepiride; SANDOZ Glimepiride   What is this drug used for?   It is used to lower blood sugar in patients with high blood sugar (diabetes).  What do I need to tell my doctor BEFORE I take this drug?   If you are allergic to this drug; any part of this drug; or any other drugs, foods, or substances. Tell your doctor about the allergy and what signs you had.  If you have a sulfa allergy.  If you have an acidic blood problem.  If you have type 1 diabetes. Do not use this drug to treat type 1 diabetes.  If the patient is a child. Do not give this drug to a child.  This is not a list of all drugs or health problems that interact with this drug.  Tell your doctor and pharmacist about all of your drugs (prescription or OTC, natural products, vitamins) and health problems. You must check to make sure that it is safe for you to take this drug with all of your drugs and health problems. Do not start, stop, or change the dose of any drug without checking with your doctor.  What are some things I need to know or do while I take this drug?   Tell all of your health care providers that you take this drug. This includes your doctors, nurses, pharmacists, and dentists.  Follow the diet and exercise plan that your doctor told you about.  Low blood sugar may happen with this drug. Very low blood sugar can lead to seizures, passing out, long lasting brain damage, and sometimes death. Talk with the doctor.  Check your blood sugar as you have been told by your doctor.  Be careful if you have low levels of an enzyme called G6PD. Anemia may happen. Low levels of G6PD may be more likely in patients of , South , , and Mediterranean descent.  Have blood work checked as you have been told by the doctor. Talk with the doctor.  Do not drive if your blood sugar has been low. There is a greater chance of you  having a crash.  Talk with your doctor before you drink alcohol.  If you also take colesevelam, take it at least 4 hours after you take this drug.  It may be harder to control blood sugar during times of stress such as fever, infection, injury, or surgery. A change in physical activity, exercise, or diet may also affect blood sugar.  This drug may raise the chance of death from heart disease. Talk with your doctor.  If you are 65 or older, use this drug with care. You could have more side effects.  Tell your doctor if you are pregnant, plan on getting pregnant, or are breast-feeding. You will need to talk about the benefits and risks to you and the baby.  What are some side effects that I need to call my doctor about right away?   WARNING/CAUTION: Even though it may be rare, some people may have very bad and sometimes deadly side effects when taking a drug. Tell your doctor or get medical help right away if you have any of the following signs or symptoms that may be related to a very bad side effect:  Signs of an allergic reaction, like rash; hives; itching; red, swollen, blistered, or peeling skin with or without fever; wheezing; tightness in the chest or throat; trouble breathing, swallowing, or talking; unusual hoarseness; or swelling of the mouth, face, lips, tongue, or throat.  Very bad dizziness or passing out.  Shortness of breath.  Low blood sugar can happen. The chance may be raised when this drug is used with other drugs for diabetes. Signs may be dizziness, headache, feeling sleepy or weak, shaking, fast heartbeat, confusion, hunger, or sweating. Call your doctor right away if you have any of these signs. Follow what you have been told to do for low blood sugar. This may include taking glucose tablets, liquid glucose, or some fruit juices.  Severe skin reactions have happened with this drug. These have included Rinaldi-Iglesia syndrome (SJS), toxic epidermal necrolysis (TEN), and other severe skin  reactions. Get medical help right away if you have signs like red, swollen, blistered, or peeling skin; other skin irritation (with or without fever); red or irritated eyes; or sores in your mouth, throat, nose, or eyes.  What are some other side effects of this drug?   All drugs may cause side effects. However, many people have no side effects or only have minor side effects. Call your doctor or get medical help if any of these side effects or any other side effects bother you or do not go away:  Upset stomach.  Feeling dizzy, tired, or weak.  Headache.  Flu-like signs.  Weight gain.  These are not all of the side effects that may occur. If you have questions about side effects, call your doctor. Call your doctor for medical advice about side effects.  You may report side effects to your national health agency.  You may report side effects to the FDA at 1-581.692.5370. You may also report side effects at https://www.fda.gov/medwatch.  How is this drug best taken?   Use this drug as ordered by your doctor. Read all information given to you. Follow all instructions closely.  Take with a meal.  Take with the first meal of the day, if taking once a day.  Take this drug at the same time of day.  Be sure you know what to do if you do not eat as much as normal or if you skip a meal.  What do I do if I miss a dose?   Take a missed dose as soon as you think about it, with a meal.  If it is close to the time for your next dose, skip the missed dose and go back to your normal time.  Do not take 2 doses at the same time or extra doses.  How do I store and/or throw out this drug?   Store at room temperature in a dry place. Do not store in a bathroom.  Keep all drugs in a safe place. Keep all drugs out of the reach of children and pets.  Throw away unused or  drugs. Do not flush down a toilet or pour down a drain unless you are told to do so. Check with your pharmacist if you have questions about the best way to throw out  drugs. There may be drug take-back programs in your area.  General drug facts   If your symptoms or health problems do not get better or if they become worse, call your doctor.  Do not share your drugs with others and do not take anyone else's drugs.  Some drugs may have another patient information leaflet. If you have any questions about this drug, please talk with your doctor, nurse, pharmacist, or other health care provider.  Some drugs may have another patient information leaflet. Check with your pharmacist. If you have any questions about this drug, please talk with your doctor, nurse, pharmacist, or other health care provider.  If you think there has been an overdose, call your poison control center or get medical care right away. Be ready to tell or show what was taken, how much, and when it happened.  Consumer Information Use and Disclaimer   This generalized information is a limited summary of diagnosis, treatment, and/or medication information. It is not meant to be comprehensive and should be used as a tool to help the user understand and/or assess potential diagnostic and treatment options. It does NOT include all information about conditions, treatments, medications, side effects, or risks that may apply to a specific patient. It is not intended to be medical advice or a substitute for the medical advice, diagnosis, or treatment of a health care provider based on the health care provider's examination and assessment of a patient's specific and unique circumstances. Patients must speak with a health care provider for complete information about their health, medical questions, and treatment options, including any risks or benefits regarding use of medications. This information does not endorse any treatments or medications as safe, effective, or approved for treating a specific patient. UpToDate, Inc. and its affiliates disclaim any warranty or liability relating to this information or the use thereof.  The use of this information is governed by the Terms of Use, available at https://www.wolterskluwer.com/en/know/clinical-effectiveness-terms.  Last Reviewed Date   2022-10-21  Copyright   © 2024 UpToDate, Inc. and its affiliates and/or licensors. All rights reserved.

## 2025-07-09 ENCOUNTER — OFFICE VISIT (OUTPATIENT)
Dept: SURGERY | Facility: CLINIC | Age: 60
End: 2025-07-09
Payer: COMMERCIAL

## 2025-07-09 DIAGNOSIS — C34.81 SMALL CELL CARCINOMA OF OVERLAPPING SITES OF RIGHT LUNG: Primary | ICD-10-CM

## 2025-07-09 PROCEDURE — 99203 OFFICE O/P NEW LOW 30 MIN: CPT | Mod: S$PBB,,, | Performed by: SURGERY

## 2025-07-09 NOTE — PROGRESS NOTES
History & Physical    SUBJECTIVE:     History of Present Illness:    59-year-old referred to me for venous access port placement for chemotherapy.  Diagnosed with right lung cancer    Chief Complaint   Patient presents with    Consult     Port placement         Review of patient's allergies indicates:  Review of patient's allergies indicates:  No Known Allergies    Medications Ordered Prior to Encounter[1]    Past Medical History:   Diagnosis Date    Hx of colonic polyps      Past Surgical History:   Procedure Laterality Date    COLONOSCOPY  2018     Family History   Problem Relation Name Age of Onset    COPD Mother  62    No Known Problems Father      Hodgkin's lymphoma Brother  32    Cancer Brother      No Known Problems Maternal Grandmother      No Known Problems Maternal Grandfather      No Known Problems Paternal Grandmother      No Known Problems Paternal Grandfather         Social History[2]       Review of Systems   Constitutional:  Positive for malaise/fatigue.   Respiratory:  Positive for cough.    Cardiovascular: Negative.    Gastrointestinal:  Positive for nausea.   Genitourinary: Negative.    Musculoskeletal:  Positive for joint pain.   Neurological: Negative.    Endo/Heme/Allergies: Negative.    Psychiatric/Behavioral: Negative.         OBJECTIVE:     There were no vitals filed for this visit.              Physical Exam:  Physical Exam  Constitutional:       Appearance: Normal appearance.   HENT:      Head: Normocephalic and atraumatic.   Eyes:      Extraocular Movements: Extraocular movements intact.      Pupils: Pupils are equal, round, and reactive to light.   Cardiovascular:      Rate and Rhythm: Normal rate and regular rhythm.   Pulmonary:      Effort: Pulmonary effort is normal.      Breath sounds: Normal breath sounds.   Abdominal:      General: Abdomen is flat. Bowel sounds are normal.      Palpations: Abdomen is soft.   Musculoskeletal:         General: Normal range of motion.      Cervical  back: Normal range of motion.   Skin:     General: Skin is warm and dry.   Neurological:      General: No focal deficit present.      Mental Status: He is alert and oriented to person, place, and time.   Psychiatric:         Mood and Affect: Mood normal.         Behavior: Behavior normal.             ASSESSMENT/PLAN:   Right lung cancer needs venous access port for chemotherapy  PLAN:  Left subclavian venous access port insertion scheduled for next Tuesday July 15, 2025.  Procedure, risk and benefits discussed and all questions answered               [1]   Current Outpatient Medications on File Prior to Visit   Medication Sig Dispense Refill    dexAMETHasone (DECADRON) 4 MG Tab Take 2 tablets (8 mg total) by mouth once daily. on days 2-4 of each chemotherapy cycle 6 tablet 3    glimepiride (AMARYL) 2 MG tablet Take 1 tablet (2 mg total) by mouth before breakfast. 90 tablet 3    HYDROcodone-acetaminophen (NORCO)  mg per tablet Take 1 tablet by mouth every 8 (eight) hours as needed for Pain. 90 tablet 0    OLANZapine (ZYPREXA) 5 MG tablet Take 1 tablet by mouth nightly on days 1-4 of each chemotherapy cycle. 4 tablet 3    ondansetron (ZOFRAN-ODT) 8 MG TbDL Take 1 tablet (8 mg total) by mouth every 6 (six) hours as needed. 30 tablet 3    prochlorperazine (COMPAZINE) 5 MG tablet Take 2 tablets (10 mg total) by mouth every 6 (six) hours as needed for Nausea. 20 tablet 5    promethazine (PHENERGAN) 25 MG tablet Take 1 tablet (25 mg total) by mouth every 8 (eight) hours. 30 tablet 3    sod sulf-pot chloride-mag sulf (SUTAB) 1.479-0.188- 0.225 gram tablet Take 12 tablets by mouth once daily. Take according to package instructions with indicated amount of water. No breakfast day before test. May substitute with Suprep, Clenpiq, Plenvu, Moviprep or GoLytely based on Rx plan and patient preference. (Patient not taking: Reported on 6/30/2025) 24 tablet 0     No current facility-administered medications on file prior to  visit.   [2]   Social History  Socioeconomic History    Marital status:    Tobacco Use    Smoking status: Former     Types: Cigarettes    Smokeless tobacco: Never   Substance and Sexual Activity    Alcohol use: Not Currently    Drug use: Never     Social Drivers of Health     Financial Resource Strain: Low Risk  (6/29/2025)    Overall Financial Resource Strain (CARDIA)     Difficulty of Paying Living Expenses: Not very hard   Food Insecurity: No Food Insecurity (6/29/2025)    Hunger Vital Sign     Worried About Running Out of Food in the Last Year: Never true     Ran Out of Food in the Last Year: Never true   Transportation Needs: No Transportation Needs (6/29/2025)    PRAPARE - Transportation     Lack of Transportation (Medical): No     Lack of Transportation (Non-Medical): No   Physical Activity: Sufficiently Active (6/29/2025)    Exercise Vital Sign     Days of Exercise per Week: 5 days     Minutes of Exercise per Session: 60 min   Stress: No Stress Concern Present (6/29/2025)    [employer] [place] [employer] - Occupational Stress Questionnaire     Feeling of Stress : Not at all   Housing Stability: Low Risk  (6/29/2025)    Housing Stability Vital Sign     Unable to Pay for Housing in the Last Year: No     Homeless in the Last Year: No

## 2025-07-15 ENCOUNTER — OUTSIDE PLACE OF SERVICE (OUTPATIENT)
Dept: SURGERY | Facility: CLINIC | Age: 60
End: 2025-07-15

## 2025-07-16 ENCOUNTER — TELEPHONE (OUTPATIENT)
Dept: HEMATOLOGY/ONCOLOGY | Facility: CLINIC | Age: 60
End: 2025-07-16
Payer: COMMERCIAL

## 2025-07-16 DIAGNOSIS — J91.0 MALIGNANT PLEURAL EFFUSION: Primary | ICD-10-CM

## 2025-07-16 NOTE — TELEPHONE ENCOUNTER
Copied from CRM #[id number]. Topic: General Inquiry - Patient Advice  >> Jul 16, 2025  9:00 AM Tamera wrote:  .Type:  Needs Medical Advice    Who Called: alexsandra with [facility]  Symptoms (please be specific):    How long has patient had these symptoms:    Pharmacy name and phone #:    Would the patient rather a call back or a response via [facility]? Call back  Best Call Back Number: [phone]  Additional Information: pt is currently in the ER and alexsandra is needing advice on moving forward

## 2025-07-17 ENCOUNTER — OUTSIDE PLACE OF SERVICE (OUTPATIENT)
Dept: INTERVENTIONAL RADIOLOGY/VASCULAR | Facility: CLINIC | Age: 60
End: 2025-07-17
Payer: COMMERCIAL

## 2025-07-21 ENCOUNTER — OFFICE VISIT (OUTPATIENT)
Dept: SURGERY | Facility: CLINIC | Age: 60
End: 2025-07-21
Payer: COMMERCIAL

## 2025-07-21 DIAGNOSIS — Z98.890 POST-OPERATIVE STATE: Primary | ICD-10-CM

## 2025-07-21 PROCEDURE — 99024 POSTOP FOLLOW-UP VISIT: CPT | Mod: ,,, | Performed by: SURGERY

## 2025-07-21 NOTE — PROGRESS NOTES
HPI:  Postop left subclavian venous access port.  Port to be used tomorrow    PHYSICAL EXAM:  Incisions are healing well and all sutures removed  ASSESSMENT:    Stable postop  PLAN:      Revisit as needed

## 2025-07-22 ENCOUNTER — TELEPHONE (OUTPATIENT)
Dept: HEMATOLOGY/ONCOLOGY | Facility: CLINIC | Age: 60
End: 2025-07-22

## 2025-07-22 ENCOUNTER — OFFICE VISIT (OUTPATIENT)
Dept: HEMATOLOGY/ONCOLOGY | Facility: CLINIC | Age: 60
End: 2025-07-22
Payer: COMMERCIAL

## 2025-07-22 VITALS
WEIGHT: 200.81 LBS | HEART RATE: 107 BPM | HEIGHT: 69 IN | SYSTOLIC BLOOD PRESSURE: 137 MMHG | BODY MASS INDEX: 29.74 KG/M2 | TEMPERATURE: 99 F | OXYGEN SATURATION: 94 % | DIASTOLIC BLOOD PRESSURE: 81 MMHG | RESPIRATION RATE: 16 BRPM

## 2025-07-22 DIAGNOSIS — T45.1X5A CHEMOTHERAPY-INDUCED NEUTROPENIA: ICD-10-CM

## 2025-07-22 DIAGNOSIS — C34.81 SMALL CELL CARCINOMA OF OVERLAPPING SITES OF RIGHT LUNG: Primary | ICD-10-CM

## 2025-07-22 DIAGNOSIS — G89.3 CANCER RELATED PAIN: ICD-10-CM

## 2025-07-22 DIAGNOSIS — D70.1 CHEMOTHERAPY-INDUCED NEUTROPENIA: ICD-10-CM

## 2025-07-22 PROCEDURE — 99215 OFFICE O/P EST HI 40 MIN: CPT | Mod: S$PBB,,, | Performed by: NURSE PRACTITIONER

## 2025-07-22 RX ORDER — PROCHLORPERAZINE EDISYLATE 5 MG/ML
10 INJECTION INTRAMUSCULAR; INTRAVENOUS ONCE AS NEEDED
Status: CANCELLED | OUTPATIENT
Start: 2025-07-23

## 2025-07-22 RX ORDER — PROCHLORPERAZINE EDISYLATE 5 MG/ML
10 INJECTION INTRAMUSCULAR; INTRAVENOUS ONCE AS NEEDED
Status: CANCELLED | OUTPATIENT
Start: 2025-07-22

## 2025-07-22 RX ORDER — DIPHENHYDRAMINE HYDROCHLORIDE 50 MG/ML
50 INJECTION, SOLUTION INTRAMUSCULAR; INTRAVENOUS ONCE AS NEEDED
Status: CANCELLED | OUTPATIENT
Start: 2025-07-24

## 2025-07-22 RX ORDER — DIPHENHYDRAMINE HYDROCHLORIDE 50 MG/ML
50 INJECTION, SOLUTION INTRAMUSCULAR; INTRAVENOUS ONCE AS NEEDED
Status: CANCELLED | OUTPATIENT
Start: 2025-07-23

## 2025-07-22 RX ORDER — EPINEPHRINE 0.3 MG/.3ML
0.3 INJECTION SUBCUTANEOUS ONCE AS NEEDED
Status: CANCELLED | OUTPATIENT
Start: 2025-07-24

## 2025-07-22 RX ORDER — OXYCODONE AND ACETAMINOPHEN 7.5; 325 MG/1; MG/1
1 TABLET ORAL EVERY 8 HOURS PRN
Qty: 90 TABLET | Refills: 0 | Status: SHIPPED | OUTPATIENT
Start: 2025-07-22

## 2025-07-22 RX ORDER — DIPHENHYDRAMINE HYDROCHLORIDE 50 MG/ML
50 INJECTION, SOLUTION INTRAMUSCULAR; INTRAVENOUS ONCE AS NEEDED
Status: CANCELLED | OUTPATIENT
Start: 2025-07-22

## 2025-07-22 RX ORDER — EPINEPHRINE 0.3 MG/.3ML
0.3 INJECTION SUBCUTANEOUS ONCE AS NEEDED
Status: CANCELLED | OUTPATIENT
Start: 2025-07-23

## 2025-07-22 RX ORDER — SODIUM CHLORIDE 0.9 % (FLUSH) 0.9 %
10 SYRINGE (ML) INJECTION
Status: CANCELLED | OUTPATIENT
Start: 2025-07-22

## 2025-07-22 RX ORDER — PROCHLORPERAZINE EDISYLATE 5 MG/ML
10 INJECTION INTRAMUSCULAR; INTRAVENOUS ONCE AS NEEDED
Status: CANCELLED | OUTPATIENT
Start: 2025-07-24

## 2025-07-22 RX ORDER — EPINEPHRINE 0.3 MG/.3ML
0.3 INJECTION SUBCUTANEOUS ONCE AS NEEDED
Status: CANCELLED | OUTPATIENT
Start: 2025-07-22

## 2025-07-22 RX ORDER — SODIUM CHLORIDE 0.9 % (FLUSH) 0.9 %
10 SYRINGE (ML) INJECTION
Status: CANCELLED | OUTPATIENT
Start: 2025-07-24

## 2025-07-22 RX ORDER — HEPARIN 100 UNIT/ML
500 SYRINGE INTRAVENOUS
Status: CANCELLED | OUTPATIENT
Start: 2025-07-22

## 2025-07-22 RX ORDER — HEPARIN 100 UNIT/ML
500 SYRINGE INTRAVENOUS
Status: CANCELLED | OUTPATIENT
Start: 2025-07-23

## 2025-07-22 RX ORDER — SODIUM CHLORIDE 0.9 % (FLUSH) 0.9 %
10 SYRINGE (ML) INJECTION
Status: CANCELLED | OUTPATIENT
Start: 2025-07-23

## 2025-07-22 RX ORDER — HEPARIN 100 UNIT/ML
500 SYRINGE INTRAVENOUS
Status: CANCELLED | OUTPATIENT
Start: 2025-07-24

## 2025-07-22 NOTE — PROGRESS NOTES
MEDICAL ONCOLOGY INITIAL CONSULTATION NOTE    Patient ID: Jose Cruz Bustillo is a 59 y.o. male.    Chief Complaint: Small cell lung carcinoma    HPI:  Patient is a 59-year-old male who presented with obstructive pneumonia and was noted to have malignant tumor in the lung and liver by CTA chest and CT abdomen pelvis. Patient was found to have sepsis from postobstructive pneumonia, thoracocentesis was done with 700 mL of bloody appearing fluid which fluid was sent to lab for studies and cytology. Patient was given IV Zosyn and IV fluid. Labs reviewed LA 2.6 with repeat 1.1, pleural fluid showed cholesterol 99, TG 19, albumin 2.5, amylase 40, RBC 11K Bloody, LDH 1 8, glucose 266, protein 4.8, AFB, culture and cytology in process, D-dimer 1640 elevated, Pro-Ethan 63.11 elevated, C-reactive protein 1.5 elevated, troponin 12 normal, proBNP 40 normal, UA/proteinuria, blood culture in process, PTT normal,  elevated,  low. CT A PE chest, CT abdomen pelvic showed perihilar mass 3.7 cm with numerous metastasis to the right pleural, right pericardial fat and lymph node with significant mass effect on the right middle and lower lobe bronchi with near complete collapse of these lobes, negative PE, large to moderate right pleural effusion before thoracocentesis, 6 mm left lower lobe pulmonary nodule, malignancy within the right hepatic dome which could be direct metastasis or extension of adjacent pleural metastasis. Patient underwent bronchoscopic biopsy on 6/19 by pulmonology and tissue biopsy sent. Patient received Zosyn as inpatient and plan to be discharged on Augmentin. Patient weaned off oxygen and noted to saturate above 95% on room air upon exertion. Upon pulmonology evaluation patient appears to have underlying COPD/asthma as well and patient planned to be discharged on Trelegy and to follow-up with pulmonology next week Cultures negative to date.  Please see pathology report below.  He presents to our clinic  today for further evaluation.    Pathology:  25    A. LN 7:    Positive for small-cell carcinoma    B. Right hilar mass biopsy:  Positive for small-cell carcinoma    C.  Bronchoalveolar lavage: Right lower lobe    Blood, neutrophils with acute inflammation and rare atypical cells          Imagin25:    CT scan:    Chest   1. There is a right perihilar mass which measures 3.7 cm. There are numerous metastases to the right pleura and a metastasis to the right pericardial fat and to a subcarinal lymph node. The right hilar mass exerts significant mass effect on right middle   and lower lobe bronchi with near complete collapse of these lobes. Additional foci of neoplasm within the collapsed right middle and lower lobes is not excluded. There is also significant mass effect on the right lower lobe pulmonary artery. Thoracic   malignancy is suboptimally evaluated on this pulmonary arterial phase CT. Venous phase chest CT could be considered.   2. Negative for pulmonary embolism.   3. Large to moderate right pleural effusion.   4. There is a 6 mm left lower lobe pulmonary nodule.     Abdomen and pelvis   1. There is evidence of malignancy within the right hepatic dome, which could represent a metastasis or direct extension by adjacent pleural metastases.         Past Medical History:   Diagnosis Date    Hx of colonic polyps      Family History   Problem Relation Name Age of Onset    COPD Mother  62    No Known Problems Father      Hodgkin's lymphoma Brother  32    Cancer Brother      No Known Problems Maternal Grandmother      No Known Problems Maternal Grandfather      No Known Problems Paternal Grandmother      No Known Problems Paternal Grandfather       Social History[1]  Past Surgical History:   Procedure Laterality Date    COLONOSCOPY  2018         Review of systems:  Review of Systems   Constitutional:  Negative for activity change, appetite change, chills, diaphoresis, fatigue and unexpected weight  change.   HENT:  Negative for congestion, facial swelling, hearing loss, mouth sores, trouble swallowing and voice change.    Eyes:  Negative for photophobia, pain, discharge and itching.   Respiratory:  Negative for apnea, cough, choking, chest tightness and shortness of breath.    Cardiovascular:  Negative for chest pain, palpitations and leg swelling.   Gastrointestinal:  Negative for abdominal distention, abdominal pain, anal bleeding and blood in stool.   Endocrine: Negative for cold intolerance, heat intolerance, polydipsia and polyphagia.   Genitourinary:  Negative for difficulty urinating, dysuria, flank pain and hematuria.   Musculoskeletal:  Negative for arthralgias, back pain, joint swelling, myalgias, neck pain and neck stiffness.   Skin:  Negative for color change, pallor and wound.   Allergic/Immunologic: Negative for environmental allergies, food allergies and immunocompromised state.   Neurological:  Negative for dizziness, seizures, facial asymmetry, speech difficulty, light-headedness, numbness and headaches.   Hematological:  Negative for adenopathy. Does not bruise/bleed easily.   Psychiatric/Behavioral:  Negative for agitation, behavioral problems, confusion, decreased concentration and sleep disturbance.            Physical Exam  Vitals and nursing note reviewed.   Constitutional:       General: He is not in acute distress.     Appearance: Normal appearance. He is not ill-appearing.   HENT:      Head: Normocephalic and atraumatic.      Nose: No congestion or rhinorrhea.   Eyes:      General: No scleral icterus.     Extraocular Movements: Extraocular movements intact.      Pupils: Pupils are equal, round, and reactive to light.   Cardiovascular:      Rate and Rhythm: Normal rate and regular rhythm.      Pulses: Normal pulses.      Heart sounds: Normal heart sounds. No murmur heard.     No gallop.   Pulmonary:      Effort: Pulmonary effort is normal. No respiratory distress.      Breath sounds:  Normal breath sounds. No stridor. No wheezing or rhonchi.   Abdominal:      General: Bowel sounds are normal. There is no distension.      Palpations: There is no mass.      Tenderness: There is no abdominal tenderness. There is no guarding.   Musculoskeletal:         General: No swelling, tenderness, deformity or signs of injury. Normal range of motion.      Cervical back: Normal range of motion and neck supple. No rigidity. No muscular tenderness.      Right lower leg: No edema.      Left lower leg: No edema.   Skin:     General: Skin is warm.      Coloration: Skin is not jaundiced or pale.      Findings: No bruising or lesion.   Neurological:      General: No focal deficit present.      Mental Status: He is alert and oriented to person, place, and time.      Cranial Nerves: No cranial nerve deficit.      Sensory: No sensory deficit.      Motor: No weakness.      Gait: Gait normal.   Psychiatric:         Mood and Affect: Mood normal.         Behavior: Behavior normal.         Thought Content: Thought content normal.       Vitals:    07/22/25 0807   BP: 137/81   Pulse: 107   Resp: 16   Temp: 98.6 °F (37 °C)   Body surface area is 2.11 meters squared.    Assessment/Plan:      ECOG 1    Extensive stage small-cell lung cancer arising from the right hilum with metastasis to the pleura, right pericardium and liver:  Stage IV    == will obtain MRI brain for completion of staging and to rule out brain Mets  == will start patient on carbo etoposide Tecentriq to be followed by Tecentriq indefinitely  == tempus testing on tumor specimen  6/30/25:  Patient here today to discuss upcoming chemotherapy/immunotherapy. An extensive discussion was had which included a thorough discussion of potential side effect profile, risk versus benefits, and expected outcomes.  Risks, including but not limited to, possible hair loss, bone marrow damage, damage to body organs, allergic reactions, sterility, nausea/vomiting,  constipation/diarrhea, sores in the mouth, secondary cancers, and rarely death were all discuss.  Specific side effects pertaining to their chemotherapy/immunotherapy medications were discussed as well.  The patient agrees with the plan and all questions and their support systems questions have been answered to their satisfaction.  Premedications were prescribed and patient was educated on appropriate usage.  Patient was educated on when to call, and given the number to call, or to notify the provider including but not limited to:  Persistent nausea and vomiting, dehydration, fever greater than 100.4 lasting over 1 hour induration or isolated feeders greater than 101, rash while on active chemotherapy or immunotherapy, severe pain or new onset pain not controlled by current pain medication regimen.  7/22/25: tolerated cycle 1 chemo IO well, ANC 1.33 will add Neulasta OBI to cycle 2-4. BRAIN MRI AMY for met disease. Will send for eval of prophylactic xrt with Dr Diaz after completion of chemo.     2. DM  Newly dx with diabetes while in-patient placed on metformin, increased LFTs advised to hold metformin and referral place to new PCP for evaluation    3. Elevated LFTs  See above    4. Referral to PCP for diabetes management    5. CINV - Antiemetics sent to pharmacy     6. Cancer related pain  Will increase norco to percocet 7.5/325 mg TID     Plan:  MRI brain discussed   Will refer to Dr Diaz after completion of chemotherapy to discuss prophylactic brain radiation   Continue carbo etoposide Tecentriq with Neulasta OBI       Return to clinic in 3 week for NP visit with labs       Future Appointments   Date Time Provider Department Center   8/8/2025 11:00 AM Sebastian Pate, NP [facility] [address]               Advance Care Planning     Date: 06/25/2025    Power of   I initiated the process of voluntary advance care planning today and explained the importance of this process to the patient.  I introduced  the concept of advance directives to the patient, as well. Then the patient received detailed information about the importance of designating a Health Care Power of  (HCPOA). He was also instructed to communicate with this person about their wishes for future healthcare, should he become sick and lose decision-making capacity. The patient has not previously appointed a HCPOA. After our discussion, the patient has decided to complete a HCPOA and has appointed his  health care agent. Please see scanned on chart. I encouraged him to communicate with this person about their wishes for future healthcare, should he become sick and lose decision-making capacity.      A total of 20 min was spent on advance care planning, goals of care discussion, emotional support, formulating and communicating prognosis and exploring burden/benefit of various approaches of treatment. This discussion occurred on a fully voluntary basis with the verbal consent of the patient and/or family.           Total time spent in counseling and discussion about further management options including relevant lab work, treatment,  prognosis, medications and intended side effects was more than 60 minutes. More than 50% of the time was spent on counseling and coordination of care.  This includes face to face time and non-face to face time preparing to see the patient (eg, review of tests), Obtaining and/or reviewing separately obtained history, Documenting clinical information in the electronic or other health record, Independently interpreting resultsand communicating results to the patient/family/caregiver, or Care coordination.                  [1]   Social History  Socioeconomic History    Marital status:    Tobacco Use    Smoking status: Former     Types: Cigarettes    Smokeless tobacco: Never   Substance and Sexual Activity    Alcohol use: Not Currently    Drug use: Never     Social Drivers of Health     Financial Resource Strain: Low  Risk  (6/29/2025)    Overall Financial Resource Strain (CARDIA)     Difficulty of Paying Living Expenses: Not very hard   Food Insecurity: No Food Insecurity (6/29/2025)    Hunger Vital Sign     Worried About Running Out of Food in the Last Year: Never true     Ran Out of Food in the Last Year: Never true   Transportation Needs: No Transportation Needs (6/29/2025)    PRAPARE - Transportation     Lack of Transportation (Medical): No     Lack of Transportation (Non-Medical): No   Physical Activity: Sufficiently Active (6/29/2025)    Exercise Vital Sign     Days of Exercise per Week: 5 days     Minutes of Exercise per Session: 60 min   Stress: No Stress Concern Present (6/29/2025)    [facility] - Occupational Stress Questionnaire     Feeling of Stress : Not at all   Housing Stability: Low Risk  (6/29/2025)    Housing Stability Vital Sign     Unable to Pay for Housing in the Last Year: No     Homeless in the Last Year: No

## 2025-07-31 ENCOUNTER — TELEPHONE (OUTPATIENT)
Dept: HEMATOLOGY/ONCOLOGY | Facility: CLINIC | Age: 60
End: 2025-07-31
Payer: COMMERCIAL

## 2025-07-31 NOTE — TELEPHONE ENCOUNTER
Copied from CRM #[id number]. Topic: General Inquiry - Patient Advice  >> Jul 31, 2025  8:58 AM Tiffany wrote:  Type:  Patient Requesting Call    Who Called:Patsy Bustillo  Does the patient know what this is regarding?:calling on behalf of the pt to speak with nurse regarding scheduling appt to get pt right lung drained  Would the patient rather a call back or a response via [facility]? call  Best Call Back Number:  [phone]    Additional Information: radiology at [facility] is where pt had it done 2 weeks ago, spouse states

## 2025-08-06 ENCOUNTER — OFFICE VISIT (OUTPATIENT)
Dept: FAMILY MEDICINE | Facility: CLINIC | Age: 60
End: 2025-08-06
Payer: COMMERCIAL

## 2025-08-06 VITALS
BODY MASS INDEX: 28.8 KG/M2 | OXYGEN SATURATION: 97 % | WEIGHT: 195 LBS | DIASTOLIC BLOOD PRESSURE: 76 MMHG | SYSTOLIC BLOOD PRESSURE: 124 MMHG

## 2025-08-06 DIAGNOSIS — E11.9 TYPE 2 DIABETES MELLITUS WITHOUT COMPLICATION, WITHOUT LONG-TERM CURRENT USE OF INSULIN: Primary | ICD-10-CM

## 2025-08-06 LAB — HBA1C MFR BLD: 10.2 %

## 2025-08-06 RX ORDER — GLIMEPIRIDE 4 MG/1
4 TABLET ORAL 2 TIMES DAILY
Qty: 180 TABLET | Refills: 3 | Status: SHIPPED | OUTPATIENT
Start: 2025-08-06 | End: 2026-08-06

## 2025-08-06 NOTE — PROGRESS NOTES
Subjective:      Patient ID: Jose Cruz Bustillo is a 59 y.o. male.    Chief Complaint: Follow-up      59 M    Main issue is DM2.  He was on metformin for a few days but his LFTs spiked so it was discontinued. Currently on glimepiride 2 mg.  Current A1C 10.2.  was 9.2.  Denies P, P, P. Denies DKA.         Past Medical History:   Diagnosis Date    Hx of colonic polyps       Social History     Socioeconomic History    Marital status:    Tobacco Use    Smoking status: Former     Types: Cigarettes    Smokeless tobacco: Never   Substance and Sexual Activity    Alcohol use: Not Currently    Drug use: Never     Social Drivers of Health     Financial Resource Strain: Low Risk  (6/29/2025)    Overall Financial Resource Strain (CARDIA)     Difficulty of Paying Living Expenses: Not very hard   Food Insecurity: No Food Insecurity (6/29/2025)    Hunger Vital Sign     Worried About Running Out of Food in the Last Year: Never true     Ran Out of Food in the Last Year: Never true   Transportation Needs: No Transportation Needs (6/29/2025)    PRAPARE - Transportation     Lack of Transportation (Medical): No     Lack of Transportation (Non-Medical): No   Physical Activity: Sufficiently Active (6/29/2025)    Exercise Vital Sign     Days of Exercise per Week: 5 days     Minutes of Exercise per Session: 60 min   Stress: No Stress Concern Present (6/29/2025)    [employer] - Occupational Stress Questionnaire     Feeling of Stress : Not at all   Housing Stability: Low Risk  (6/29/2025)    Housing Stability Vital Sign     Unable to Pay for Housing in the Last Year: No     Homeless in the Last Year: No      Family History   Problem Relation Name Age of Onset    COPD Mother  62    No Known Problems Father      Hodgkin's lymphoma Brother  32    Cancer Brother      No Known Problems Maternal Grandmother      No Known Problems Maternal Grandfather      No Known Problems Paternal Grandmother      No Known  Problems Paternal Grandfather          ROS:   Review of Systems   Constitutional:  Negative for appetite change, chills, fatigue and fever.   HENT:  Negative for sore throat, tinnitus and trouble swallowing.    Respiratory:  Negative for chest tightness, shortness of breath and wheezing.    Cardiovascular:  Negative for chest pain and palpitations.   Gastrointestinal:  Negative for abdominal pain and diarrhea.   Genitourinary:  Negative for difficulty urinating, dysuria and hematuria.   Musculoskeletal:  Negative for myalgias.   Skin:  Negative for rash.   Neurological:  Negative for dizziness, weakness and headaches.   Psychiatric/Behavioral:  Negative for dysphoric mood, sleep disturbance and suicidal ideas. The patient is not nervous/anxious.      Objective:   Physical Exam  Vitals and nursing note reviewed.   Constitutional:       General: He is not in acute distress.     Appearance: Normal appearance. He is not ill-appearing.   HENT:      Head: Normocephalic and atraumatic.   Cardiovascular:      Rate and Rhythm: Normal rate.   Pulmonary:      Effort: Pulmonary effort is normal.   Abdominal:      General: Abdomen is flat.   Musculoskeletal:         General: Normal range of motion.      Cervical back: Normal range of motion.   Skin:     General: Skin is warm.   Neurological:      Mental Status: He is alert and oriented to person, place, and time. Mental status is at baseline.   Psychiatric:         Mood and Affect: Mood normal.         Behavior: Behavior normal.         Thought Content: Thought content normal.         Judgment: Judgment normal.       Assessment:     1. Type 2 diabetes mellitus without complication, without long-term current use of insulin      No images are attached to the encounter.   Plan:     Problem List Items Addressed This Visit          Endocrine    Type 2 diabetes mellitus without complication, without long-term current use of insulin - Primary    Current Assessment & Plan   A1C 10.2        PLAN      Start glimepiride 4 mg daily for two weeks.  Increase to 4 mg bid at that time.   RTC in 3 mts for A1C.   Will address lipids at that time.          Relevant Medications    glimepiride (AMARYL) 4 MG tablet    Other Relevant Orders    POCT HEMOGLOBIN A1C (Completed)     Procedures     Office Visit on 08/06/2025   Component Date Value Ref Range Status    Hemoglobin A1C, POC 08/06/2025 10.2  % Final   Ancillary Orders on 07/18/2025   Component Date Value Ref Range Status    WBC 07/21/2025 3.51 (L)  4.3 - 10.8 X 10 3/ul Final    RBC 07/21/2025 4.85  4.7 - 6.1 X 10 6/ul Final    RDW-SD 07/21/2025 41.2  37 - 54 fl Final    Hemoglobin 07/21/2025 12.9 (L)  14 - 18 g/dL Final    Hematocrit 07/21/2025 40.3 (L)  42 - 52 % Final    MCV 07/21/2025 83.1  80 - 94 fl Final    MCH 07/21/2025 26.6 (L)  27 - 32 pg Final    MCHC 07/21/2025 32.0  32 - 36 g/dL Final    Platelets 07/21/2025 306  135 - 400 X 10 3/ul Final    Neutrophils 07/21/2025 37.8  34 - 71.1 % Final    Lymphocytes 07/21/2025 40.5  19.3 - 53.1 % Final    Monocytes 07/21/2025 17.1 (H)  4.7 - 12.5 % Final    Eosinophils 07/21/2025 2.6  0.7 - 7.0 % Final    Basophils 07/21/2025 1.7 (H)  0.2 - 1.2 % Final    Neutrophils Absolute 07/21/2025 1.33 (L)  2.15 - 7.56 X 10 3/ul Final    Lymphocytes Absolute 07/21/2025 1.42  0.86 - 4.75 X 10 3/ul Final    Monocytes Absolute 07/21/2025 0.60  0.22 - 1.08 X 10 3/ul Final    Eosinophils Absolute 07/21/2025 0.09  0.04 - 0.54 X 10 3/ul Final    Basophils Absolute 07/21/2025 0.06  0.00 - 0.22 X 10 3/ul Final    Immature Granulocytes Absolute 07/21/2025 0.01  0 - 0.04 X 10 3/ul Final    Immature Granulocytes 07/21/2025 0.3  0 - 0.5 % Final    IG includes metamyelocytes, myelocytes, and promyelocytes    nRBC# 07/21/2025 0.0  0 - 0.2 /100 WBC Final    nRBC Count Absolute 07/21/2025 0.000  0 - 0.012 x 10 3/ul Final    Comment: NOTE  Testing performed at:  [facility], [address] [place] [address]  [id number] DAWNA  #:[id number]      Glucose 07/21/2025 181 (H)  82 - 115 mg/dL Final    BUN 07/21/2025 8.6  8 - 23 mg/dL Final    Creatinine 07/21/2025 1.05  0.70 - 1.20 mg/dL Final    AST 07/21/2025 39  0 - 40 U/L Final    ALT (SGPT) 07/21/2025 68 (H)  0 - 41 U/L Final    Alkaline Phosphatase 07/21/2025 121  40 - 130 U/L Final    Calcium 07/21/2025 9.5  8.6 - 10.2 mg/dL Final    Protein, Total 07/21/2025 7.1  6.4 - 8.3 g/dL Final    Albumin 07/21/2025 3.7  3.5 - 5.2 g/dL Final    BILIRUBIN, TOTAL 07/21/2025 0.17  0.00 - 1.20 mg/dL Final    Sodium 07/21/2025 140  136 - 145 mmol/L Final    Potassium 07/21/2025 4.1  3.5 - 5.1 mmol/L Final    Chloride 07/21/2025 100  98 - 107 mmol/L Final    CO2 07/21/2025 24  22 - 29 mmol/L Final    Globulin 07/21/2025 3.4  1.5 - 4.5 g/dL Final    Albumin/Globulin Ratio 07/21/2025 1.1  1.0 - 2.7 Final    BUN/Creatinine Ratio 07/21/2025 8.2  6 - 20 Final    GFR ESTIMATION 07/21/2025 80.76  >60.00 mL/min/1.73m2 Final    Anion Gap 07/21/2025 16.0  8.0 - 17.0 mmol/L Final    Comment: NOTE  Testing performed at:  [facility], [address] [place] [address] CLIA #:[id number]      TSH 07/21/2025 1.20  0.27 - 4.20 uIU/mL Final    Comment: NOTE  Testing performed at:  [facility], [address] [place] [address]  [id number] CLIA #:[id number]      Multiple Orders 07/21/2025 SEE BELOW   Final    Comment: Your patient has submitted more than one order to us. The orders are  from different physicians. The different orders include some of the  same tests. You may receive results from tests you did not order.  Orders given to us in this manner are only submitted to insurance once  and a copy of results are sent to each physician. The resulting  process may take several days for all tests to be completed on the  separate accession numbers.  NOTE  Testing performed at:  [facility], [address] [place] [address]  [id number] CLIA #:[id number]     Office Visit on 07/08/2025    Component Date Value Ref Range Status    Cholesterol 07/21/2025 195  100 - 200 mg/dL Final    Comment: Desirable  <200  Borderline 200-240  High risk  >240      Triglycerides 07/21/2025 89  0 - 150 mg/dL Final    HDL 07/21/2025 47 (L)  >60 mg/dL Final    Comment: <40 mg/dL Major risk factor for CHD  >60 mg/dL Negative risk factor for CHD      LDL Cholesterol 07/21/2025 130.2 (H)  0 - 100 mg/dL Final    LDL/HDL Ratio 07/21/2025 2.8  1 - 3 Final    Comment: NOTE  Testing performed at:  [facility], [address] [place] [address]  [address] CLIA #:[id number]      Hemoglobin A1C, POC 07/08/2025 9.2  % Final    PSA Diagnostic 07/21/2025 0.975  0.00 - 4.00 ng/mL Final    Comment: This method has been standardized against the WHO (World Health  Organization) reference standard.  Methodology used - electrochemiluminescence immunoassay (ECLIA)  PSA values determined on patient samples by different testing  laboratories cannot be directly compared with one another and could be  the cause of erroneous medical interpretations.  NOTE  Testing performed at:  [facility], [address] [place] [address]  [id number] CLIA #:[id number]          X-Ray Chest 1 View  Result Date: 7/17/2025  XR CHEST XRAY 1 VW    Reason for exam:  s/p rt thoracentesis    COMPARISON: 7/16/2025    Findings:     Moderate right pleural effusion persists, slightly decreased.. Left lung clear. Mediport implanted on the left.. No change      US Thoracentesis RT US   Result Date: 7/17/2025  PROCEDURE: Ultrasound-guided thoracentesis    Procedural Personnel  Attending physician(s): Bharti Escobar  Fellow physician(s): None  Resident physician(s): None  Advanced practice provider(s): None    Pre-procedure diagnosis: Pleural effusion  Post-procedure diagnosis: Same  Indication: Therapeutic  Additional clinical history: None    Complications: No immediate complications.      X-Ray Chest 1 View  Result Date: 7/16/2025  XR CHEST XRAY 1  VW    HISTORY:  right sided cp, sob h/o recurrent pleural effusion, left side port placed yesterday, lung cancer    COMPARISON:7/15/2025      Fluoroscopy Guided Central Venous Access Device Placement  Result Date: 7/15/2025  FL FLUOROSCOPY GUIDED CENTRAL VENOUS ACCESS DEVICE PLACEMENT    Intraprocedural image(s). No radiologist interpretation provided. Please see procedure report.    Signer Name: Yang Longo MD  Signed: 7/15/2025 10:27 AM CDT  ()      X-Ray Chest 1 View  Result Date: 7/15/2025  XR CHEST XRAY 1 VW    HISTORY:  post Left subclavian venous access port insertion    COMPARISON:6/28/2025      MRI Brain W WO Contrast  Result Date: 7/10/2025  MRI BRAIN W WO CONTRAST    CLINICAL INDICATION: METS, 10CC VUEWAY    COMPARISON: No previous cross-sectional imaging of the brain for comparison.    TECHNIQUE: Multiplanar, multisequence, magnetic resonance imaging of the brain was performed without and with the use of intravenous gadolinium contrast.    FINDINGS: There are scattered FLAIR and T2 hyperintensities in the bilateral periventricular white matter and centrum semiovale, consistent with microangiopathic ischemic change although other differential possibilities, such as effects of hypertension,   vasculitis, or diabetes mellitus, cannot be completely excluded there is a 2 mm T1 hypointensity and corresponding T2 hyperintensity within the left basal ganglia, most likely related to a remote lacunar infarct although a prominent perivascular space   could have a similar appearance. the lateral ventricles are midline in position and normal in size. No acute intracranial hemorrhage, edema, mass effect, midline shift, or abnormal enhancement is noted. There is no evidence of any diffusion restriction   to suggest any acute intracranial ischemia.    The corpus callosum and pituitary gland are unremarkable. There is no evidence of any cerebellar tonsillar ectopia.    There are scattered areas of increased T2  signal inferiorly in the right mastoid antrum consistent with fluid and/or mucoperiosteal thickening. The left mastoid antrum and both middle ear cavities are clear. There is some peripheral increased T2 signal   in the bilateral ethmoid sinuses, consistent with mucoperiosteal thickening. There is some increased T2 signal dependently in the sphenoid sinuses which could be related to mucoperiosteal thickening and/or fluid.           Duration of encounter:  minutes  This includes face-to-face time and non face-to-face time preparing to see the patient (eg, review of tests), obtaining and/or reviewing separately obtained history, documenting clinical information in the electronic or other health record, independently interpreting resultsand communicating results to the patient/family/caregiver, or care coordination      DISCLAIMER: This note was prepared with [employer] voice recognition transcription software. Garbled syntax, mangled pronouns, and other bizarre constructions may be attributed to that software system.     All diagnostic data (labs/imaging) was reviewed with the patient and/or family member in the room. All preventative measures (vaccinations, screenings, testing, imaging) were discussed in depth and offered to the patient in accordance with current medical guidelines to ensure the patient is up to date.  All questions were answered to their liking. The patient and/or family member voiced understanding of all instructions provided. Expectations regarding follow up and treatment plan were voiced and confirmed prior to departure. The patient was given orders/instructions at the end of the visit for reference. They were instructed to notify my office if they have not been contacted for imaging/referrals/labs/results in 1-2 weeks. They voiced understanding of all of the above.     Follow up:     There are no Patient Instructions on file for this visit.     Follow up in about 3 months (around 11/6/2025), or if  symptoms worsen or fail to improve.

## 2025-08-06 NOTE — ASSESSMENT & PLAN NOTE
A1C 10.2       PLAN      Start glimepiride 4 mg daily for two weeks.  Increase to 4 mg bid at that time.   RTC in 3 mts for A1C.   Will address lipids at that time.

## 2025-08-11 PROBLEM — T45.1X5A CINV (CHEMOTHERAPY-INDUCED NAUSEA AND VOMITING): Status: RESOLVED | Noted: 2025-06-30 | Resolved: 2025-08-11

## 2025-08-11 PROBLEM — R79.89 ELEVATED LFTS: Status: RESOLVED | Noted: 2025-06-30 | Resolved: 2025-08-11

## 2025-08-11 PROBLEM — R11.2 CINV (CHEMOTHERAPY-INDUCED NAUSEA AND VOMITING): Status: RESOLVED | Noted: 2025-06-30 | Resolved: 2025-08-11

## 2025-08-12 ENCOUNTER — OFFICE VISIT (OUTPATIENT)
Dept: HEMATOLOGY/ONCOLOGY | Facility: CLINIC | Age: 60
End: 2025-08-12
Payer: COMMERCIAL

## 2025-08-12 VITALS
HEART RATE: 110 BPM | TEMPERATURE: 99 F | WEIGHT: 194.81 LBS | SYSTOLIC BLOOD PRESSURE: 122 MMHG | DIASTOLIC BLOOD PRESSURE: 86 MMHG | RESPIRATION RATE: 16 BRPM | HEIGHT: 69 IN | BODY MASS INDEX: 28.85 KG/M2 | OXYGEN SATURATION: 95 %

## 2025-08-12 DIAGNOSIS — E11.9 TYPE 2 DIABETES MELLITUS WITHOUT COMPLICATION, WITHOUT LONG-TERM CURRENT USE OF INSULIN: ICD-10-CM

## 2025-08-12 DIAGNOSIS — G89.3 CANCER RELATED PAIN: ICD-10-CM

## 2025-08-12 DIAGNOSIS — C34.81 SMALL CELL CARCINOMA OF OVERLAPPING SITES OF RIGHT LUNG: Primary | ICD-10-CM

## 2025-08-12 PROCEDURE — 99215 OFFICE O/P EST HI 40 MIN: CPT | Mod: S$PBB,,, | Performed by: NURSE PRACTITIONER

## 2025-08-12 PROCEDURE — G2211 COMPLEX E/M VISIT ADD ON: HCPCS | Mod: ,,, | Performed by: NURSE PRACTITIONER

## 2025-08-12 RX ORDER — SODIUM CHLORIDE 0.9 % (FLUSH) 0.9 %
10 SYRINGE (ML) INJECTION
Status: CANCELLED | OUTPATIENT
Start: 2025-08-13

## 2025-08-12 RX ORDER — EPINEPHRINE 0.3 MG/.3ML
0.3 INJECTION SUBCUTANEOUS ONCE AS NEEDED
Status: CANCELLED | OUTPATIENT
Start: 2025-08-12

## 2025-08-12 RX ORDER — DIPHENHYDRAMINE HYDROCHLORIDE 50 MG/ML
50 INJECTION, SOLUTION INTRAMUSCULAR; INTRAVENOUS ONCE AS NEEDED
Status: CANCELLED | OUTPATIENT
Start: 2025-08-13

## 2025-08-12 RX ORDER — HEPARIN 100 UNIT/ML
500 SYRINGE INTRAVENOUS
Status: CANCELLED | OUTPATIENT
Start: 2025-08-14

## 2025-08-12 RX ORDER — EPINEPHRINE 0.3 MG/.3ML
0.3 INJECTION SUBCUTANEOUS ONCE AS NEEDED
Status: CANCELLED | OUTPATIENT
Start: 2025-08-14

## 2025-08-12 RX ORDER — SODIUM CHLORIDE 0.9 % (FLUSH) 0.9 %
10 SYRINGE (ML) INJECTION
Status: CANCELLED | OUTPATIENT
Start: 2025-08-12

## 2025-08-12 RX ORDER — DIPHENHYDRAMINE HYDROCHLORIDE 50 MG/ML
50 INJECTION, SOLUTION INTRAMUSCULAR; INTRAVENOUS ONCE AS NEEDED
Status: CANCELLED | OUTPATIENT
Start: 2025-08-12

## 2025-08-12 RX ORDER — PROCHLORPERAZINE EDISYLATE 5 MG/ML
10 INJECTION INTRAMUSCULAR; INTRAVENOUS ONCE AS NEEDED
Status: CANCELLED | OUTPATIENT
Start: 2025-08-14

## 2025-08-12 RX ORDER — EPINEPHRINE 0.3 MG/.3ML
0.3 INJECTION SUBCUTANEOUS ONCE AS NEEDED
Status: CANCELLED | OUTPATIENT
Start: 2025-08-13

## 2025-08-12 RX ORDER — DIPHENHYDRAMINE HYDROCHLORIDE 50 MG/ML
50 INJECTION, SOLUTION INTRAMUSCULAR; INTRAVENOUS ONCE AS NEEDED
Status: CANCELLED | OUTPATIENT
Start: 2025-08-14

## 2025-08-12 RX ORDER — PROCHLORPERAZINE EDISYLATE 5 MG/ML
10 INJECTION INTRAMUSCULAR; INTRAVENOUS ONCE AS NEEDED
Status: CANCELLED | OUTPATIENT
Start: 2025-08-12

## 2025-08-12 RX ORDER — PROCHLORPERAZINE EDISYLATE 5 MG/ML
10 INJECTION INTRAMUSCULAR; INTRAVENOUS ONCE AS NEEDED
Status: CANCELLED | OUTPATIENT
Start: 2025-08-13

## 2025-08-12 RX ORDER — HEPARIN 100 UNIT/ML
500 SYRINGE INTRAVENOUS
Status: CANCELLED | OUTPATIENT
Start: 2025-08-12

## 2025-08-12 RX ORDER — SODIUM CHLORIDE 0.9 % (FLUSH) 0.9 %
10 SYRINGE (ML) INJECTION
Status: CANCELLED | OUTPATIENT
Start: 2025-08-14

## 2025-08-12 RX ORDER — HEPARIN 100 UNIT/ML
500 SYRINGE INTRAVENOUS
Status: CANCELLED | OUTPATIENT
Start: 2025-08-13

## 2025-09-02 ENCOUNTER — OFFICE VISIT (OUTPATIENT)
Dept: HEMATOLOGY/ONCOLOGY | Facility: CLINIC | Age: 60
End: 2025-09-02
Payer: COMMERCIAL

## 2025-09-02 VITALS
SYSTOLIC BLOOD PRESSURE: 131 MMHG | HEIGHT: 69 IN | BODY MASS INDEX: 28.36 KG/M2 | RESPIRATION RATE: 16 BRPM | TEMPERATURE: 99 F | DIASTOLIC BLOOD PRESSURE: 86 MMHG | HEART RATE: 104 BPM | WEIGHT: 191.5 LBS | OXYGEN SATURATION: 96 %

## 2025-09-02 DIAGNOSIS — C34.81 SMALL CELL CARCINOMA OF OVERLAPPING SITES OF RIGHT LUNG: Primary | ICD-10-CM

## 2025-09-02 PROCEDURE — 99215 OFFICE O/P EST HI 40 MIN: CPT | Mod: S$GLB,,, | Performed by: NURSE PRACTITIONER

## 2025-09-02 RX ORDER — HEPARIN 100 UNIT/ML
500 SYRINGE INTRAVENOUS
Status: CANCELLED | OUTPATIENT
Start: 2025-09-02

## 2025-09-02 RX ORDER — DIPHENHYDRAMINE HYDROCHLORIDE 50 MG/ML
50 INJECTION, SOLUTION INTRAMUSCULAR; INTRAVENOUS ONCE AS NEEDED
Status: CANCELLED | OUTPATIENT
Start: 2025-09-02 | End: 2037-01-28

## 2025-09-02 RX ORDER — PROCHLORPERAZINE EDISYLATE 5 MG/ML
10 INJECTION INTRAMUSCULAR; INTRAVENOUS ONCE AS NEEDED
Status: CANCELLED | OUTPATIENT
Start: 2025-09-02

## 2025-09-02 RX ORDER — SODIUM CHLORIDE 0.9 % (FLUSH) 0.9 %
10 SYRINGE (ML) INJECTION
Status: CANCELLED | OUTPATIENT
Start: 2025-09-02

## 2025-09-02 RX ORDER — EPINEPHRINE 0.3 MG/.3ML
0.3 INJECTION SUBCUTANEOUS ONCE AS NEEDED
Status: CANCELLED | OUTPATIENT
Start: 2025-09-02 | End: 2037-01-28